# Patient Record
Sex: MALE | Race: BLACK OR AFRICAN AMERICAN | Employment: FULL TIME | URBAN - METROPOLITAN AREA
[De-identification: names, ages, dates, MRNs, and addresses within clinical notes are randomized per-mention and may not be internally consistent; named-entity substitution may affect disease eponyms.]

---

## 2018-01-15 ENCOUNTER — HOSPITAL ENCOUNTER (EMERGENCY)
Age: 53
Discharge: HOME | End: 2018-01-15
Attending: EMERGENCY MEDICINE
Payer: COMMERCIAL

## 2018-01-15 VITALS
HEART RATE: 92 BPM | OXYGEN SATURATION: 98 % | SYSTOLIC BLOOD PRESSURE: 173 MMHG | DIASTOLIC BLOOD PRESSURE: 95 MMHG | WEIGHT: 300 LBS | BODY MASS INDEX: 42.95 KG/M2 | RESPIRATION RATE: 16 BRPM | HEIGHT: 70 IN | TEMPERATURE: 98.6 F

## 2018-01-15 DIAGNOSIS — S46.911A RIGHT SHOULDER STRAIN, INITIAL ENCOUNTER: Primary | ICD-10-CM

## 2018-01-15 PROCEDURE — 99282 EMERGENCY DEPT VISIT SF MDM: CPT

## 2018-01-15 PROCEDURE — 74011250636 HC RX REV CODE- 250/636: Performed by: EMERGENCY MEDICINE

## 2018-01-15 PROCEDURE — 96372 THER/PROPH/DIAG INJ SC/IM: CPT

## 2018-01-15 RX ORDER — NAPROXEN 500 MG/1
500 TABLET ORAL 2 TIMES DAILY WITH MEALS
Qty: 20 TAB | Refills: 0 | Status: SHIPPED | OUTPATIENT
Start: 2018-01-15 | End: 2018-01-25

## 2018-01-15 RX ORDER — KETOROLAC TROMETHAMINE 30 MG/ML
60 INJECTION, SOLUTION INTRAMUSCULAR; INTRAVENOUS
Status: COMPLETED | OUTPATIENT
Start: 2018-01-15 | End: 2018-01-15

## 2018-01-15 RX ADMIN — KETOROLAC TROMETHAMINE 60 MG: 30 INJECTION, SOLUTION INTRAMUSCULAR at 15:56

## 2018-01-15 NOTE — ED TRIAGE NOTES
Reports pain in his right shoulder today starting 1 hour ago while working. Pt able to lift his right arm up.

## 2018-01-15 NOTE — ED PROVIDER NOTES
Patient is a 47 y.o. male presenting with shoulder pain. The history is provided by the patient. Shoulder Pain    The incident occurred 1 to 2 hours ago. The incident occurred at work. There was no injury mechanism (was pulling objects repeatedly with his arm). The right shoulder is affected. The pain is at a severity of 10/10. The pain has been constant since onset. The pain does not radiate. There is no history of shoulder injury. There is no history of shoulder surgery. No past medical history on file. Past Surgical History:   Procedure Laterality Date    HX HERNIA REPAIR      umbilical    HX ORTHOPAEDIC Left 2010    arthroscopy    HX OTHER SURGICAL Left     inguinal hernia repair         No family history on file. Social History     Social History    Marital status:      Spouse name: N/A    Number of children: N/A    Years of education: N/A     Occupational History    Not on file. Social History Main Topics    Smoking status: Current Every Day Smoker     Packs/day: 1.00     Years: 14.00    Smokeless tobacco: Never Used    Alcohol use 0.5 oz/week     1 Cans of beer per week      Comment: rare    Drug use: No    Sexual activity: Not on file     Other Topics Concern    Not on file     Social History Narrative    No narrative on file         ALLERGIES: Review of patient's allergies indicates no known allergies. Review of Systems   Constitutional: Negative for chills and fever. Respiratory: Negative for chest tightness and shortness of breath. Cardiovascular: Negative for chest pain. Musculoskeletal:        Right shoulder pain       Vitals:    01/15/18 1530   BP: (!) 173/95   Pulse: 92   Resp: 16   Temp: 98.6 °F (37 °C)   SpO2: 98%   Weight: 136.1 kg (300 lb)   Height: 5' 10\" (1.778 m)            Physical Exam   Constitutional: He is oriented to person, place, and time. He appears well-developed and well-nourished.    Cardiovascular: Normal rate, regular rhythm and intact distal pulses. Pulmonary/Chest: Effort normal. No respiratory distress. Musculoskeletal: Normal range of motion. He exhibits tenderness. He exhibits no deformity. Right shoulder: He exhibits tenderness and pain. He exhibits normal range of motion, no bony tenderness, no swelling, no effusion, no deformity, no laceration, no spasm, normal pulse and normal strength. Arms:  Neurological: He is alert and oriented to person, place, and time. Vitals reviewed. MDM  Number of Diagnoses or Management Options  Diagnosis management comments: Musculoskeletal shoulder pain - possible rotator cuff, more likely shoulder extensors being overused at work.   Treat with toradol and d/c home with RICE therapy and naprosyn and muscle relaxants  No EMC, no imaging needed    ED Course       Procedures

## 2018-07-03 ENCOUNTER — HOSPITAL ENCOUNTER (OUTPATIENT)
Dept: PREADMISSION TESTING | Age: 53
Discharge: HOME | End: 2018-07-03
Payer: OTHER MISCELLANEOUS

## 2018-07-03 VITALS
SYSTOLIC BLOOD PRESSURE: 145 MMHG | TEMPERATURE: 98.4 F | WEIGHT: 310.19 LBS | RESPIRATION RATE: 20 BRPM | BODY MASS INDEX: 43.43 KG/M2 | DIASTOLIC BLOOD PRESSURE: 69 MMHG | HEIGHT: 71 IN | HEART RATE: 72 BPM | OXYGEN SATURATION: 99 %

## 2018-07-03 LAB
ABO + RH BLD: NORMAL
ALBUMIN SERPL-MCNC: 3.8 G/DL (ref 3.5–5)
ALBUMIN/GLOB SERPL: 1.1 {RATIO} (ref 1.1–2.2)
ALP SERPL-CCNC: 59 U/L (ref 45–117)
ALT SERPL-CCNC: 33 U/L (ref 12–78)
ANION GAP SERPL CALC-SCNC: 8 MMOL/L (ref 5–15)
APPEARANCE UR: CLEAR
APTT PPP: 38.5 SEC (ref 22.1–32)
AST SERPL-CCNC: 18 U/L (ref 15–37)
ATRIAL RATE: 71 BPM
BACTERIA URNS QL MICRO: NEGATIVE /HPF
BASOPHILS # BLD: 0.1 K/UL (ref 0–0.1)
BASOPHILS NFR BLD: 1 % (ref 0–1)
BILIRUB SERPL-MCNC: 0.3 MG/DL (ref 0.2–1)
BILIRUB UR QL: NEGATIVE
BLOOD GROUP ANTIBODIES SERPL: NORMAL
BUN SERPL-MCNC: 11 MG/DL (ref 6–20)
BUN/CREAT SERPL: 10 (ref 12–20)
CALCIUM SERPL-MCNC: 9.3 MG/DL (ref 8.5–10.1)
CALCULATED P AXIS, ECG09: 40 DEGREES
CALCULATED R AXIS, ECG10: 8 DEGREES
CALCULATED T AXIS, ECG11: 45 DEGREES
CHLORIDE SERPL-SCNC: 104 MMOL/L (ref 97–108)
CO2 SERPL-SCNC: 28 MMOL/L (ref 21–32)
COLOR UR: ABNORMAL
CREAT SERPL-MCNC: 1.06 MG/DL (ref 0.7–1.3)
DIAGNOSIS, 93000: NORMAL
DIFFERENTIAL METHOD BLD: ABNORMAL
EOSINOPHIL # BLD: 0.4 K/UL (ref 0–0.4)
EOSINOPHIL NFR BLD: 6 % (ref 0–7)
EPITH CASTS URNS QL MICRO: ABNORMAL /LPF
ERYTHROCYTE [DISTWIDTH] IN BLOOD BY AUTOMATED COUNT: 12.6 % (ref 11.5–14.5)
EST. AVERAGE GLUCOSE BLD GHB EST-MCNC: 134 MG/DL
GLOBULIN SER CALC-MCNC: 3.5 G/DL (ref 2–4)
GLUCOSE SERPL-MCNC: 92 MG/DL (ref 65–100)
GLUCOSE UR STRIP.AUTO-MCNC: NEGATIVE MG/DL
HBA1C MFR BLD: 6.3 % (ref 4.2–6.3)
HCT VFR BLD AUTO: 43 % (ref 36.6–50.3)
HGB BLD-MCNC: 14.3 G/DL (ref 12.1–17)
HGB UR QL STRIP: NEGATIVE
HYALINE CASTS URNS QL MICRO: ABNORMAL /LPF (ref 0–5)
IMM GRANULOCYTES # BLD: 0 K/UL (ref 0–0.04)
IMM GRANULOCYTES NFR BLD AUTO: 1 % (ref 0–0.5)
INR PPP: 1 (ref 0.9–1.1)
KETONES UR QL STRIP.AUTO: NEGATIVE MG/DL
LEUKOCYTE ESTERASE UR QL STRIP.AUTO: ABNORMAL
LYMPHOCYTES # BLD: 3.1 K/UL (ref 0.8–3.5)
LYMPHOCYTES NFR BLD: 44 % (ref 12–49)
MCH RBC QN AUTO: 30.2 PG (ref 26–34)
MCHC RBC AUTO-ENTMCNC: 33.3 G/DL (ref 30–36.5)
MCV RBC AUTO: 90.9 FL (ref 80–99)
MONOCYTES # BLD: 0.5 K/UL (ref 0–1)
MONOCYTES NFR BLD: 8 % (ref 5–13)
NEUTS SEG # BLD: 2.8 K/UL (ref 1.8–8)
NEUTS SEG NFR BLD: 41 % (ref 32–75)
NITRITE UR QL STRIP.AUTO: NEGATIVE
NRBC # BLD: 0 K/UL (ref 0–0.01)
NRBC BLD-RTO: 0 PER 100 WBC
P-R INTERVAL, ECG05: 180 MS
PH UR STRIP: 5.5 [PH] (ref 5–8)
PLATELET # BLD AUTO: 290 K/UL (ref 150–400)
PMV BLD AUTO: 9.7 FL (ref 8.9–12.9)
POTASSIUM SERPL-SCNC: 5 MMOL/L (ref 3.5–5.1)
PROT SERPL-MCNC: 7.3 G/DL (ref 6.4–8.2)
PROT UR STRIP-MCNC: NEGATIVE MG/DL
PROTHROMBIN TIME: 10.4 SEC (ref 9–11.1)
Q-T INTERVAL, ECG07: 392 MS
QRS DURATION, ECG06: 78 MS
QTC CALCULATION (BEZET), ECG08: 425 MS
RBC # BLD AUTO: 4.73 M/UL (ref 4.1–5.7)
RBC #/AREA URNS HPF: ABNORMAL /HPF (ref 0–5)
SODIUM SERPL-SCNC: 140 MMOL/L (ref 136–145)
SP GR UR REFRACTOMETRY: 1.01 (ref 1–1.03)
SPECIMEN EXP DATE BLD: NORMAL
THERAPEUTIC RANGE,PTTT: ABNORMAL SECS (ref 58–77)
UA: UC IF INDICATED,UAUC: ABNORMAL
UROBILINOGEN UR QL STRIP.AUTO: 0.2 EU/DL (ref 0.2–1)
VENTRICULAR RATE, ECG03: 71 BPM
WBC # BLD AUTO: 6.9 K/UL (ref 4.1–11.1)
WBC URNS QL MICRO: ABNORMAL /HPF (ref 0–4)

## 2018-07-03 PROCEDURE — 80053 COMPREHEN METABOLIC PANEL: CPT | Performed by: ORTHOPAEDIC SURGERY

## 2018-07-03 PROCEDURE — 85610 PROTHROMBIN TIME: CPT | Performed by: ORTHOPAEDIC SURGERY

## 2018-07-03 PROCEDURE — 81001 URINALYSIS AUTO W/SCOPE: CPT | Performed by: ORTHOPAEDIC SURGERY

## 2018-07-03 PROCEDURE — 83036 HEMOGLOBIN GLYCOSYLATED A1C: CPT | Performed by: ORTHOPAEDIC SURGERY

## 2018-07-03 PROCEDURE — 36415 COLL VENOUS BLD VENIPUNCTURE: CPT | Performed by: ORTHOPAEDIC SURGERY

## 2018-07-03 PROCEDURE — 85025 COMPLETE CBC W/AUTO DIFF WBC: CPT | Performed by: ORTHOPAEDIC SURGERY

## 2018-07-03 PROCEDURE — 93005 ELECTROCARDIOGRAM TRACING: CPT

## 2018-07-03 PROCEDURE — 86900 BLOOD TYPING SEROLOGIC ABO: CPT | Performed by: ORTHOPAEDIC SURGERY

## 2018-07-03 PROCEDURE — 85730 THROMBOPLASTIN TIME PARTIAL: CPT | Performed by: ORTHOPAEDIC SURGERY

## 2018-07-03 PROCEDURE — 87086 URINE CULTURE/COLONY COUNT: CPT | Performed by: ORTHOPAEDIC SURGERY

## 2018-07-03 NOTE — H&P
PAT Pre-Op History & Physical    Patient: Laz Olivares. MRN: [id number]          SSN: [id number]  YOB: 1965          Age: 47 y.o. Sex: male                Subjective:     Patient is a 47 y.o.  male who presents with history of January 15 he felt a pop in his neck while at work. He felt extreme pain and was brought to the hospital. He had a full work up done. He was told he also has a torn RC. Pain is mostly in the back of his neck- base of neck and down into his right arm, numbness in first three fingers. He is right hand dominant. Has not tried to push his pain limits. Pain ranges from 2/10-4/10. Nothing specific raises the pain to higher. Sitting arm a certain way will flare up. Sleeping can be hard. Received shoulder injections and PT, ice/heat application, Tylenol, muslce relaxant, Tramadol, Gabapentin, prednisone. The patient was evaluated in the surgeon's office and it was determined that the most appropriate plan of care is to proceed with surgical intervention. Patient's PCP Scott Traore MD      Past Medical History:   Diagnosis Date    Arthritis     Chronic pain     lower back pain    [facility] 40.0-44.9[facility]    BMI- 43.3      Past Surgical History:   Procedure Laterality Date    HX HERNIA REPAIR      umbilical    HX HERNIA REPAIR      incisional    HX KNEE ARTHROSCOPY Right 1996    HX OPEN CHOLECYSTECTOMY  2014    HX OTHER SURGICAL Left     inguinal hernia repair    HX SHOULDER ARTHROSCOPY Left 2010      Prior to Admission medications    Medication Sig Start Date End Date Taking? Authorizing Provider   acetaminophen (TYLENOL EXTRA STRENGTH) 500 mg tablet Take 1,000 mg by mouth daily as needed for Pain. Yes Historical Provider     Current Outpatient Prescriptions   Medication Sig    acetaminophen (TYLENOL EXTRA STRENGTH) 500 mg tablet Take 1,000 mg by mouth daily as needed for Pain. No current facility-administered medications for this encounter. No Known Allergies   Social History   Substance Use Topics    Smoking status: Former Smoker     Packs/day: 1.00     Years: 15.00     Quit date: 6/17/2018    Smokeless tobacco: Never Used    Alcohol use 0.5 oz/week     1 Cans of beer per week      Comment: rare      History   Drug Use No     Family History   Problem Relation Age of Onset    Hypertension Mother     Diabetes Mother     Arrhythmia Mother      pacemaker    No Known Problems Father     Hypertension Sister     Diabetes Sister     Hypertension Brother     No Known Problems Brother     No Known Problems Brother          Review of Systems    Patient denies difficulty swallowing, mouth sores, or loose teeth. Patient denies any recent dental procedures or any planned prior to surgery. Patient denies chest pain, tightness, pain radiating down left arm, palpitations. Denies dizziness, visual disturbances, or lightheadedness. Patient denies shortness of breath, wheezing, cough, fever, or chills. Patient denies diarrhea, constipation, or abdominal pain. Patient denies urinary problems including dysuria, hesitancy, urgency, or incontinence. Denies skin breakdown, rashes, insect bites or open area. Objective:     Visit Vitals    /69 (BP 1 Location: Left arm, BP Patient Position: At rest;Sitting)    Pulse 72    Temp 98.4 °F (36.9 °C)    Resp 20    Ht 5' 11\" (1.803 m)    Wt 140.7 kg (310 lb 3 oz)    SpO2 99%    BMI 43.26 kg/m2       Body mass index is 43.26 kg/(m^2). Wt Readings from Last 1 Encounters:   07/03/18 140.7 kg (310 lb 3 oz)        Physical Exam:     General: Pleasant,  cooperative, no apparent distress, appears stated age. Eyes: Conjunctivae/corneas clear. EOMs intact. Nose: Nares normal.   Mouth/Throat: Lips, mucosa, and tongue normal. Teeth and gums normal.   Lungs: Clear to auscultation bilaterally.    Heart: Regular rate and rhythm, S1, S2 normal. No murmur, click, rub or gallop. Abdomen: Soft, non-tender. Bowel sounds normal. No distention. Musculoskeletal:  Painful cervical spine. Extremities:  Extremities normal, atraumatic, no cyanosis or edema. Calves                                 supple, non tender to palpation. Pulses: 2+ and symmetric bilateral upper extremities. Cap. refill <2 seconds   Skin: Skin color, texture, turgor normal. No visible open areas, examined fully clothed   Neurologic: CN II-XII grossly intact. Alert and oriented x3. Labs:   Recent Results (from the past 72 hour(s))   CBC WITH AUTOMATED DIFF    Collection Time: 07/03/18  8:57 AM   Result Value Ref Range    WBC 6.9 4.1 - 11.1 K/uL    RBC 4.73 4.10 - 5.70 M/uL    HGB 14.3 12.1 - 17.0 g/dL    HCT 43.0 36.6 - 50.3 %    MCV 90.9 80.0 - 99.0 FL    MCH 30.2 26.0 - 34.0 PG    MCHC 33.3 30.0 - 36.5 g/dL    RDW 12.6 11.5 - 14.5 %    PLATELET 381 993 - 774 K/uL    MPV 9.7 8.9 - 12.9 FL    NRBC 0.0 0  WBC    ABSOLUTE NRBC 0.00 0.00 - 0.01 K/uL    NEUTROPHILS 41 32 - 75 %    LYMPHOCYTES 44 12 - 49 %    MONOCYTES 8 5 - 13 %    EOSINOPHILS 6 0 - 7 %    BASOPHILS 1 0 - 1 %    IMMATURE GRANULOCYTES 1 (H) 0.0 - 0.5 %    ABS. NEUTROPHILS 2.8 1.8 - 8.0 K/UL    ABS. LYMPHOCYTES 3.1 0.8 - 3.5 K/UL    ABS. MONOCYTES 0.5 0.0 - 1.0 K/UL    ABS. EOSINOPHILS 0.4 0.0 - 0.4 K/UL    ABS. BASOPHILS 0.1 0.0 - 0.1 K/UL    ABS. IMM.  GRANS. 0.0 0.00 - 0.04 K/UL    DF AUTOMATED     METABOLIC PANEL, COMPREHENSIVE    Collection Time: 07/03/18  8:57 AM   Result Value Ref Range    Sodium 140 136 - 145 mmol/L    Potassium 5.0 3.5 - 5.1 mmol/L    Chloride 104 97 - 108 mmol/L    CO2 28 21 - 32 mmol/L    Anion gap 8 5 - 15 mmol/L    Glucose 92 65 - 100 mg/dL    BUN 11 6 - 20 MG/DL    Creatinine 1.06 0.70 - 1.30 MG/DL    BUN/Creatinine ratio 10 (L) 12 - 20      GFR est AA >60 >60 ml/min/1.73m2    GFR est non-AA >60 >60 ml/min/1.73m2    Calcium 9.3 8.5 - 10.1 MG/DL    Bilirubin, total 0.3 0.2 - 1.0 MG/DL    ALT (SGPT) 33 12 - 78 U/L    AST (SGOT) 18 15 - 37 U/L    Alk. phosphatase 59 45 - 117 U/L    Protein, total 7.3 6.4 - 8.2 g/dL    Albumin 3.8 3.5 - 5.0 g/dL    Globulin 3.5 2.0 - 4.0 g/dL    A-G Ratio 1.1 1.1 - 2.2     CULTURE, MRSA    Collection Time: 07/03/18  8:57 AM   Result Value Ref Range    Special Requests: NO SPECIAL REQUESTS      Culture result: MRSA NOT PRESENT      Culture result:            Screening of patient nares for MRSA is for surveillance purposes and, if positive, to facilitate isolation considerations in high risk settings. It is not intended for automatic decolonization interventions per se as regimens are not sufficiently effective to warrant routine use.    HEMOGLOBIN A1C WITH EAG    Collection Time: 07/03/18  9:25 AM   Result Value Ref Range    Hemoglobin A1c 6.3 4.2 - 6.3 %    Est. average glucose 134 mg/dL   PROTHROMBIN TIME + INR    Collection Time: 07/03/18  9:25 AM   Result Value Ref Range    INR 1.0 0.9 - 1.1      Prothrombin time 10.4 9.0 - 11.1 sec   PTT    Collection Time: 07/03/18  9:25 AM   Result Value Ref Range    aPTT 38.5 (H) 22.1 - 32.0 sec    aPTT, therapeutic range     58.0 - 77.0 SECS   URINALYSIS W/ REFLEX CULTURE    Collection Time: 07/03/18  9:25 AM   Result Value Ref Range    Color YELLOW/STRAW      Appearance CLEAR CLEAR      Specific gravity 1.013 1.003 - 1.030      pH (UA) 5.5 5.0 - 8.0      Protein NEGATIVE  NEG mg/dL    Glucose NEGATIVE  NEG mg/dL    Ketone NEGATIVE  NEG mg/dL    Bilirubin NEGATIVE  NEG      Blood NEGATIVE  NEG      Urobilinogen 0.2 0.2 - 1.0 EU/dL    Nitrites NEGATIVE  NEG      Leukocyte Esterase MODERATE (A) NEG      WBC 10-20 0 - 4 /hpf    RBC 0-5 0 - 5 /hpf    Epithelial cells FEW FEW /lpf    Bacteria NEGATIVE  NEG /hpf    UA:UC IF INDICATED URINE CULTURE ORDERED (A) CNI      Hyaline cast 0-2 0 - 5 /lpf   TYPE & SCREEN    Collection Time: 07/03/18  9:25 AM   Result Value Ref Range    Crossmatch Expiration 07/13/2018     ABO/Rh(D) B POSITIVE     Antibody screen NEG    CULTURE, URINE    Collection Time: 07/03/18  9:25 AM   Result Value Ref Range    Special Requests: NO SPECIAL REQUESTS  Reflexed from [id number]        Martinez Count <1,000 CFU/ML      Culture result: NO GROWTH 1 DAY     EKG, 12 LEAD, INITIAL    Collection Time: 07/03/18  9:43 AM   Result Value Ref Range    Ventricular Rate 71 BPM    Atrial Rate 71 BPM    P-R Interval 180 ms    QRS Duration 78 ms    Q-T Interval 392 ms    QTC Calculation (Bezet) 425 ms    Calculated P Axis 40 degrees    Calculated R Axis 8 degrees    Calculated T Axis 45 degrees    Diagnosis       Normal sinus rhythm  Normal ECG  When compared with ECG of 05-NOV-2013 21:24,  No significant change was found  Confirmed by Cindy Figueroa MD., Jessica ([id number]) on 7/3/2018 3:10:40 PM         Assessment:     CERVICAL RADICULITIS  CERVICAL DISC HERNIATION     Plan:     Scheduled for C6-C7  ANTERIOR CERVICAL DISCECTOMY FUSION WITH INSTRUMENTATION. All labs reviewed. EKG reviewed. MRSA negative. PTT returned high at 38.5- routed to surgeon via EMR.      Nisreen Duran, ADRIEN

## 2018-07-03 NOTE — PERIOP NOTES
[address] [employer] [facility], [address]    MAIN OR [phone]    MAIN PRE OP [phone]    AMBULATORY PRE OP [phone]    PRE-ADMISSION TESTING [phone]       Surgery Date:   Tuesday 7/10/18      Is surgery arrival time given by surgeon? NO  If NO, [facility] staff will call you between 3 and 7pm the day before your surgery with your arrival time. (If your surgery is on a Monday, we will call you the Friday before.)    Call [phone] after 7pm Monday-Friday if you did not receive your arrival time.     Answers to Common Questions   When You  Arrive   Arrive at the [facility] [address] on the day of your surgery  Have your insurance card, photo ID, and any copayment (if needed)     Food   and   Drink NO food or drink after midnight the night before surgery    This means NO water, gum, mints, coffee, juice, etc.  No alcohol (beer, wine, liquor) 24 hours before and after surgery     Medicine to   TAKE   Morning of Surgery MEDICATIONS TO TAKE THE MORNING OF SURGERY WITH A SIP OF WATER:    None      Medicine  To  STOP FOR PAIN   NO Aspirin for pain    NO Non-Steroidal Anti-Inflammatory Drugs (NSAIDs:   for example, Ibuprofen (Advil, Motrin), Naproxen (Aleve)   STOP herbal supplements and vitamins 1 week before surgery   You can take Tylenol  follow instructions on the bottle     Blood  Thinners    If you take Aspirin, Plavix, Coumadin, blood-thinning or anti-clot medicine, talk to your surgeon and/or follow the instructions from the doctor who told you to take that medicine     Clothing  Jewelry  Valuables  Bathing     CLOTHING   Wear loose, comfortable clothes   Wear glasses instead of contacts   Leave money, jewelry and valuables at home   No make-up, particularly mascara, the day of surgery   REMOVE ALL piercings, rings, and jewelry - leave at home   Wear hair loose or down; no pony-tails, buns, or metal hair clips    BATHING   Follow all special bath instructions (for total joint replacement, spine and bowel surgeries.)   If you shower the morning of surgery, please do not apply any lotions, powders, or deodorants afterwards. Do not shave or trim anywhere 24 hours before surgery. Going Home  or  Spending the Night    SAME-DAY SURGERY: You must have a responsible adult drive you home and stay with you 24 hours after surgery   ADMITS: If your doctor is keeping you into the hospital after surgery, leave personal belongings/luggage in your car until you have a hospital room number. Hospital discharge time is 12 noon  Drivers must be here before 12 noon unless you are told differently         Follow all instructions so your surgery wont be cancelled. Please, be on time. If a situation occurs and you are delayed the day of surgery, call [phone] or [phone]. If your physical condition changes (like a fever, cold, flu, etc.) call your surgeon as soon as possible. The Pread[facility] Testing staff can be reached at [phone]. OTHER SPECIAL INSTRUCTIONS:   · Use Chlorhexidine Care Fusion wash and sponges 3 days prior to surgery as instructed. · Incentive spirometer given with instructions to practice at home and bring back to the hospital on the day of surgery. · [facility] will contact you if your Hemoglobin A1C is greater than 7.5. · Ensure/Glucerna  sample, nutritional information, and Ensure/Glucerna coupon given. · Pain pamphlet and Call Don't Fall reminder reviewed with patient. ·  parking is complimentary Monday - Friday 7 am - 5 pm  · Bring PTA Medication list day of surgery with the last doses taken documented   Do not bring medication bottles the day of surgery    The patient was contacted  in person. He  verbalize  understanding of all instructions and does not  need reinforcement.

## 2018-07-04 LAB
BACTERIA SPEC CULT: NORMAL
CC UR VC: NORMAL
SERVICE CMNT-IMP: NORMAL
SERVICE CMNT-IMP: NORMAL

## 2018-08-01 ENCOUNTER — HOSPITAL ENCOUNTER (OUTPATIENT)
Dept: PREADMISSION TESTING | Age: 53
Discharge: HOME | End: 2018-08-01
Payer: OTHER MISCELLANEOUS

## 2018-08-01 VITALS
WEIGHT: 315 LBS | DIASTOLIC BLOOD PRESSURE: 59 MMHG | TEMPERATURE: 98.7 F | OXYGEN SATURATION: 100 % | HEART RATE: 71 BPM | BODY MASS INDEX: 45.1 KG/M2 | SYSTOLIC BLOOD PRESSURE: 132 MMHG | RESPIRATION RATE: 17 BRPM | HEIGHT: 70 IN

## 2018-08-01 LAB
ABO + RH BLD: NORMAL
ALBUMIN SERPL-MCNC: 3.9 G/DL (ref 3.5–5)
ALBUMIN/GLOB SERPL: 1 {RATIO} (ref 1.1–2.2)
ALP SERPL-CCNC: 63 U/L (ref 45–117)
ALT SERPL-CCNC: 38 U/L (ref 12–78)
ANION GAP SERPL CALC-SCNC: 7 MMOL/L (ref 5–15)
APPEARANCE UR: CLEAR
APTT PPP: 39.6 SEC (ref 22.1–32)
AST SERPL-CCNC: 23 U/L (ref 15–37)
BACTERIA URNS QL MICRO: NEGATIVE /HPF
BASOPHILS # BLD: 0 K/UL (ref 0–0.1)
BASOPHILS NFR BLD: 1 % (ref 0–1)
BILIRUB SERPL-MCNC: 0.3 MG/DL (ref 0.2–1)
BILIRUB UR QL: NEGATIVE
BLOOD GROUP ANTIBODIES SERPL: NORMAL
BUN SERPL-MCNC: 13 MG/DL (ref 6–20)
BUN/CREAT SERPL: 11 (ref 12–20)
CALCIUM SERPL-MCNC: 9 MG/DL (ref 8.5–10.1)
CHLORIDE SERPL-SCNC: 105 MMOL/L (ref 97–108)
CO2 SERPL-SCNC: 27 MMOL/L (ref 21–32)
COLOR UR: ABNORMAL
CREAT SERPL-MCNC: 1.16 MG/DL (ref 0.7–1.3)
DIFFERENTIAL METHOD BLD: ABNORMAL
EOSINOPHIL # BLD: 0.4 K/UL (ref 0–0.4)
EOSINOPHIL NFR BLD: 6 % (ref 0–7)
EPITH CASTS URNS QL MICRO: ABNORMAL /LPF
ERYTHROCYTE [DISTWIDTH] IN BLOOD BY AUTOMATED COUNT: 12.8 % (ref 11.5–14.5)
EST. AVERAGE GLUCOSE BLD GHB EST-MCNC: 137 MG/DL
GLOBULIN SER CALC-MCNC: 3.8 G/DL (ref 2–4)
GLUCOSE SERPL-MCNC: 85 MG/DL (ref 65–100)
GLUCOSE UR STRIP.AUTO-MCNC: NEGATIVE MG/DL
HBA1C MFR BLD: 6.4 % (ref 4.2–6.3)
HCT VFR BLD AUTO: 44.4 % (ref 36.6–50.3)
HGB BLD-MCNC: 14.8 G/DL (ref 12.1–17)
HGB UR QL STRIP: NEGATIVE
HYALINE CASTS URNS QL MICRO: ABNORMAL /LPF (ref 0–5)
IMM GRANULOCYTES # BLD: 0 K/UL (ref 0–0.04)
IMM GRANULOCYTES NFR BLD AUTO: 1 % (ref 0–0.5)
INR PPP: 1 (ref 0.9–1.1)
KETONES UR QL STRIP.AUTO: NEGATIVE MG/DL
LEUKOCYTE ESTERASE UR QL STRIP.AUTO: ABNORMAL
LYMPHOCYTES # BLD: 3.4 K/UL (ref 0.8–3.5)
LYMPHOCYTES NFR BLD: 48 % (ref 12–49)
MCH RBC QN AUTO: 30.1 PG (ref 26–34)
MCHC RBC AUTO-ENTMCNC: 33.3 G/DL (ref 30–36.5)
MCV RBC AUTO: 90.2 FL (ref 80–99)
MONOCYTES # BLD: 0.5 K/UL (ref 0–1)
MONOCYTES NFR BLD: 7 % (ref 5–13)
NEUTS SEG # BLD: 2.7 K/UL (ref 1.8–8)
NEUTS SEG NFR BLD: 38 % (ref 32–75)
NITRITE UR QL STRIP.AUTO: NEGATIVE
NRBC # BLD: 0 K/UL (ref 0–0.01)
NRBC BLD-RTO: 0 PER 100 WBC
PH UR STRIP: 6.5 [PH] (ref 5–8)
PLATELET # BLD AUTO: 287 K/UL (ref 150–400)
PMV BLD AUTO: 9.7 FL (ref 8.9–12.9)
POTASSIUM SERPL-SCNC: 4.4 MMOL/L (ref 3.5–5.1)
PROT SERPL-MCNC: 7.7 G/DL (ref 6.4–8.2)
PROT UR STRIP-MCNC: NEGATIVE MG/DL
PROTHROMBIN TIME: 10.2 SEC (ref 9–11.1)
RBC # BLD AUTO: 4.92 M/UL (ref 4.1–5.7)
RBC #/AREA URNS HPF: ABNORMAL /HPF (ref 0–5)
SODIUM SERPL-SCNC: 139 MMOL/L (ref 136–145)
SP GR UR REFRACTOMETRY: 1.02 (ref 1–1.03)
SPECIMEN EXP DATE BLD: NORMAL
THERAPEUTIC RANGE,PTTT: ABNORMAL SECS (ref 58–77)
UA: UC IF INDICATED,UAUC: ABNORMAL
UROBILINOGEN UR QL STRIP.AUTO: 0.2 EU/DL (ref 0.2–1)
WBC # BLD AUTO: 7 K/UL (ref 4.1–11.1)
WBC URNS QL MICRO: ABNORMAL /HPF (ref 0–4)

## 2018-08-01 PROCEDURE — 36415 COLL VENOUS BLD VENIPUNCTURE: CPT | Performed by: ORTHOPAEDIC SURGERY

## 2018-08-01 PROCEDURE — 85730 THROMBOPLASTIN TIME PARTIAL: CPT | Performed by: ORTHOPAEDIC SURGERY

## 2018-08-01 PROCEDURE — 80053 COMPREHEN METABOLIC PANEL: CPT | Performed by: ORTHOPAEDIC SURGERY

## 2018-08-01 PROCEDURE — 83036 HEMOGLOBIN GLYCOSYLATED A1C: CPT | Performed by: ORTHOPAEDIC SURGERY

## 2018-08-01 PROCEDURE — 81001 URINALYSIS AUTO W/SCOPE: CPT | Performed by: ORTHOPAEDIC SURGERY

## 2018-08-01 PROCEDURE — 86900 BLOOD TYPING SEROLOGIC ABO: CPT | Performed by: ORTHOPAEDIC SURGERY

## 2018-08-01 PROCEDURE — 85610 PROTHROMBIN TIME: CPT | Performed by: ORTHOPAEDIC SURGERY

## 2018-08-01 PROCEDURE — 85025 COMPLETE CBC W/AUTO DIFF WBC: CPT | Performed by: ORTHOPAEDIC SURGERY

## 2018-08-01 NOTE — H&P
PAT Pre-Op History & Physical 
 
Patient: Red Poplin. MRN: [id number]          SSN: [id number] YOB: 1965          Age: 47 y.o. Sex: male Subjective:  
Patient is a 47 y.o.  male who presents with history of chronic neck pain that began 01/15/2018 when he was injured at work. Patient states he was shoveling dirt when he felt a \"pop\" at the base of his neck and felt a pain in his right arm/hand. Also had rotator cuff tear- was treated with steroid injections and PT but patient states that the treatment for his rotator cuff escalated his neck pain. Rates his pain 3/10- 6/10 and states that it radiates from the base of his neck into his right shoulder and down his right arm to his hand, Describes the pain as constant aching but can also be shooting pain. Also c/o constant numbness and tingling  in his right index finger and the inner aspect of his right middle finger as well as the space between his index finger and thumb. Also c/o aching sharp pain in these areas of his right hand at times. Patient is right hand dominant. Cannot identify any particular movement or position that escalates his pain. States he can not lift much with his right hand. Has been out of work since he was injured. Has failed oral steroids, NSAIDs, Tylenol, Gabapentin, and Tramadol. The patient was evaluated in the surgeon's office and it was determined that the most appropriate plan of care is to proceed with surgical intervention. Patient's PCP Earnest Taylor MD 
 
 
 
 
 
Past Medical History:  
Diagnosis Date  Arthritis  Chronic pain   
 lower back pain  Morbid [facility] with BMI [facility] 45.0-49.9[facility]01[facility] BMI= 45.9 Past Surgical History:  
Procedure Laterality Date  HX HERNIA REPAIR  87/7998  
 umbilical  
 HX HERNIA REPAIR  2016  
 incisional  (above umbilicar repair)  HX KNEE ARTHROSCOPY Right 1996  HX OPEN CHOLECYSTECTOMY  2014  HX OTHER SURGICAL Left   
 inguinal hernia repair  HX SHOULDER ARTHROSCOPY Left 2010 Prior to Admission medications Medication Sig Start Date End Date Taking? Authorizing Provider  
acetaminophen (TYLENOL EXTRA STRENGTH) 500 mg tablet Take 1,000 mg by mouth daily as needed for Pain. Historical Provider Current Outpatient Prescriptions Medication Sig  
 acetaminophen (TYLENOL EXTRA STRENGTH) 500 mg tablet Take 1,000 mg by mouth daily as needed for Pain. No current facility-administered medications for this encounter. No Known Allergies Social History Substance Use Topics  Smoking status: Former Smoker Packs/day: 1.00 Years: 15.00 Quit date: 6/17/2018  Smokeless tobacco: Never Used  Alcohol use 0.5 oz/week 1 Cans of beer per week Comment: rare History Drug Use No  
 
Family History Problem Relation Age of Onset  Hypertension Mother  Diabetes Mother  Arrhythmia Mother   
  pacemaker  No Known Problems Father  Hypertension Sister  Diabetes Sister  Hypertension Brother  No Known Problems Brother  No Known Problems Brother Review of Systems Patient denies difficulty swallowing, mouth sores, or loose teeth. Patient denies any recent dental procedures or any planned prior to surgery. Patient denies chest pain, tightness, pain radiating down left arm, palpitations. Denies dizziness, visual disturbances, or lightheadedness. Patient denies shortness of breath, wheezing, cough, fever, or chills. Patient denies diarrhea, constipation, or abdominal pain. Patient denies urinary problems including dysuria, hesitancy, urgency, or incontinence. Denies skin breakdown, rashes, insect bites or open area. C/o pain in neck and right arm/hand. Objective:  
Patient Vitals for the past 24 hrs: 
 Temp Pulse Resp BP SpO2  
08/01/18 1127 98.7 °F (37.1 °C) 71 17 132/59 100 % Temp (24hrs), Av.7 °F (37.1 °C), Min:98.7 °F (37.1 °C), Max:98.7 °F (37.1 °C) Body mass index is 45.85 kg/(m^2). Wt Readings from Last 1 Encounters:  
18 142.9 kg (315 lb) Physical Exam: 
 
 General: Pleasant,  cooperative, no apparent distress, appears stated age. Obese. Eyes: Conjunctivae/corneas clear. EOMs intact. Nose: Nares normal. 
 Mouth/Throat: Lips, mucosa, and tongue normal. Teeth and gums normal. 
 Neck: Supple, symmetrical, trachea midline. Back: Symmetric Lungs: Clear to auscultation bilaterally. Heart: Regular rate and rhythm, S1, S2 normal. No murmur, click, rub or gallop. Abdomen: Soft, non-tender. Bowel sounds normal. No distention. Musculoskeletal:  Right  weaker than left. Extremities:  Extremities normal, atraumatic, no cyanosis or edema. Calves 
                               supple, non tender to palpation. Pulses: 2+ and symmetric bilateral upper extremities. Cap. refill <2 seconds Skin: Skin color, texture, turgor normal.  No rashes or lesions. Neurologic: CN II-XII grossly intact. Alert and oriented x3. Labs:  
Recent Results (from the past 72 hour(s)) CULTURE, MRSA Collection Time: 18 11:57 AM  
Result Value Ref Range Special Requests: NO SPECIAL REQUESTS Culture result: MRSA NOT PRESENT AT 14 HOURS    
CBC WITH AUTOMATED DIFF Collection Time: 18 12:05 PM  
Result Value Ref Range WBC 7.0 4.1 - 11.1 K/uL  
 RBC 4.92 4.10 - 5.70 M/uL  
 HGB 14.8 12.1 - 17.0 g/dL HCT 44.4 36.6 - 50.3 % MCV 90.2 80.0 - 99.0 FL  
 MCH 30.1 26.0 - 34.0 PG  
 MCHC 33.3 30.0 - 36.5 g/dL  
 RDW 12.8 11.5 - 14.5 % PLATELET 365 470 - 976 K/uL MPV 9.7 8.9 - 12.9 FL  
 NRBC 0.0 0  WBC ABSOLUTE NRBC 0.00 0.00 - 0.01 K/uL NEUTROPHILS 38 32 - 75 % LYMPHOCYTES 48 12 - 49 % MONOCYTES 7 5 - 13 % EOSINOPHILS 6 0 - 7 % BASOPHILS 1 0 - 1 % IMMATURE GRANULOCYTES 1 (H) 0.0 - 0.5 % ABS.  NEUTROPHILS 2.7 1.8 - 8.0 K/UL ABS. LYMPHOCYTES 3.4 0.8 - 3.5 K/UL  
 ABS. MONOCYTES 0.5 0.0 - 1.0 K/UL  
 ABS. EOSINOPHILS 0.4 0.0 - 0.4 K/UL  
 ABS. BASOPHILS 0.0 0.0 - 0.1 K/UL  
 ABS. IMM. GRANS. 0.0 0.00 - 0.04 K/UL  
 DF AUTOMATED METABOLIC PANEL, COMPREHENSIVE Collection Time: 08/01/18 12:05 PM  
Result Value Ref Range Sodium 139 136 - 145 mmol/L Potassium 4.4 3.5 - 5.1 mmol/L Chloride 105 97 - 108 mmol/L  
 CO2 27 21 - 32 mmol/L Anion gap 7 5 - 15 mmol/L Glucose 85 65 - 100 mg/dL BUN 13 6 - 20 MG/DL Creatinine 1.16 0.70 - 1.30 MG/DL  
 BUN/Creatinine ratio 11 (L) 12 - 20 GFR est AA >60 >60 ml/min/1.73m2 GFR est non-AA >60 >60 ml/min/1.73m2 Calcium 9.0 8.5 - 10.1 MG/DL Bilirubin, total 0.3 0.2 - 1.0 MG/DL  
 ALT (SGPT) 38 12 - 78 U/L  
 AST (SGOT) 23 15 - 37 U/L Alk. phosphatase 63 45 - 117 U/L Protein, total 7.7 6.4 - 8.2 g/dL Albumin 3.9 3.5 - 5.0 g/dL Globulin 3.8 2.0 - 4.0 g/dL A-G Ratio 1.0 (L) 1.1 - 2.2 HEMOGLOBIN A1C WITH EAG Collection Time: 08/01/18 12:05 PM  
Result Value Ref Range Hemoglobin A1c 6.4 (H) 4.2 - 6.3 % Est. average glucose 137 mg/dL PROTHROMBIN TIME + INR Collection Time: 08/01/18 12:05 PM  
Result Value Ref Range INR 1.0 0.9 - 1.1 Prothrombin time 10.2 9.0 - 11.1 sec PTT Collection Time: 08/01/18 12:05 PM  
Result Value Ref Range aPTT 39.6 (H) 22.1 - 32.0 sec  
 aPTT, therapeutic range     58.0 - 77.0 SECS  
TYPE & SCREEN Collection Time: 08/01/18 12:05 PM  
Result Value Ref Range Crossmatch Expiration 08/09/2018 ABO/Rh(D) B POSITIVE Antibody screen NEG   
URINALYSIS W/ REFLEX CULTURE Collection Time: 08/01/18 12:05 PM  
Result Value Ref Range Color YELLOW/STRAW Appearance CLEAR CLEAR Specific gravity 1.019 1.003 - 1.030    
 pH (UA) 6.5 5.0 - 8.0 Protein NEGATIVE  NEG mg/dL Glucose NEGATIVE  NEG mg/dL Ketone NEGATIVE  NEG mg/dL Bilirubin NEGATIVE  NEG  Blood NEGATIVE  NEG Urobilinogen 0.2 0.2 - 1.0 EU/dL Nitrites NEGATIVE  NEG Leukocyte Esterase TRACE (A) NEG    
 WBC 0-4 0 - 4 /hpf  
 RBC 0-5 0 - 5 /hpf Epithelial cells FEW FEW /lpf Bacteria NEGATIVE  NEG /hpf  
 UA:UC IF INDICATED CULTURE NOT INDICATED BY UA RESULT CNI Hyaline cast 0-2 0 - 5 /lpf Assessment:  
 
Cervical radiculitis Cervical disc herniation. Plan:  
 
Scheduled for C6-  C7 ACDF with instrumentation. Labs done per surgeon's orders. Lab results reviewed- unremarkable except PTT elevated (PT/INR WNL)- surgeon's office notified via EMR. MRSA pending. EKG done 7/03/2018 reviewed in [facility]- unremarkable.  
 
 
 
Radha Bishop NP

## 2018-08-01 NOTE — PERIOP NOTES
INSTRUCTIONS [address] [address] [address] MAIN OR [phone] MAIN PRE OP [phone] AMBULATORY PRE OP [phone] PRE-ADMISSION TESTING 21  Surgery Date:   8/6/18 Is surgery arrival time given by surgeon? NO If Jimmie Ni staff will call you between 3 and 7pm the day before your surgery with your arrival time. (If your surgery is on a Monday, we will call you the Friday before.) Call [phone] after 7pm Monday-Friday if you did not receive your arrival time. Answers to Common Questions When You 
Arrive Arrive at the 2nd [address] [facility] on the day of your surgery Have your insurance card, photo ID, and any copayment (if needed) Food 
 and  
Drink NO food or drink after midnight the night before surgery This means NO water, gum, mints, coffee, juice, etc. 
No alcohol (beer, wine, liquor) 24 hours before and after surgery Medicine to TAKE Morning of Surgery MEDICATIONS TO TAKE THE MORNING OF SURGERY WITH A SIP OF WATER:  
 NONE Medicine To 
STOP  
FOR PAIN 
 NO Aspirin for pain  NO Non-Steroidal Anti-Inflammatory Drugs (NSAIDs:  
for example, Ibuprofen (Advil, Motrin), Naproxen (Aleve)  STOP herbal supplements and vitamins 1 week before surgery  You can take Tylenol  follow instructions on the bottle Blood Thinners  If you take Aspirin, Plavix, Coumadin, blood-thinning or anti-clot medicine, talk to your surgeon and/or follow the instructions from the doctor who told you to take that medicine Clothing Jewelry Valuables Bathing CLOTHING 
 Wear loose, comfortable clothes  Wear glasses instead of contacts  Leave money, jewelry and valuables at home  No make-up, particularly mascara, the day of surgery  REMOVE ALL piercings, rings, and jewelry - leave at home  Wear hair loose or down; no pony-tails, buns, or metal hair clips BATHING 
 Follow all special bath instructions (for total joint replacement, spine and bowel surgeries.)  If you shower the morning of surgery, please do not apply any lotions, powders, or deodorants afterwards. Do not shave or trim anywhere 24 hours before surgery. Going Home 
or Spending the Night  SAME-DAY SURGERY: You must have a responsible adult drive you home and stay with you 24 hours after surgery  ADMITS: If your doctor is keeping you into the hospital after surgery, leave personal belongings/luggage in your car until you have a hospital room number. Hospital discharge time is 12 noon Drivers must be here before 12 noon unless you are told differently Follow all instructions so your surgery wont be cancelled. Please, be on time. If a situation occurs and you are delayed the day of surgery, call [phone] or [phone]. If your physical condition changes (like a fever, cold, flu, etc.) call your surgeon as soon as possible. The Preadmission [facility] staff can be reached at [phone]. OTHER SPECIAL INSTRUCTIONS:    Special Instructions: · Use Chlorhexidine Care Fusion wash and sponges 3 days prior to surgery as instructed. · Incentive spirometer given with instructions to practice at home and bring back to the hospital on the day of surgery. · [facility] will contact you if your Hemoglobin A1C is greater than 7.5. · Ensure/Glucerna  sample, nutritional information, and Ensure/Glucerna coupon given. · Pain pamphlet and Call Don't Fall reminder reviewed with patient. ·  parking is complimentary Monday - Friday 7 am - 5 pm 
· Bring PTA Medication list day of surgery with the last doses taken documented The patient was contacted  in person. He  verbalize  understanding of all instructions and does not  need reinforcement.

## 2018-08-02 LAB
BACTERIA SPEC CULT: NORMAL
BACTERIA SPEC CULT: NORMAL
SERVICE CMNT-IMP: NORMAL

## 2018-08-03 ENCOUNTER — ANESTHESIA EVENT (OUTPATIENT)
Dept: SURGERY | Age: 53
End: 2018-08-03
Payer: OTHER MISCELLANEOUS

## 2018-08-06 ENCOUNTER — HOSPITAL ENCOUNTER (OUTPATIENT)
Age: 53
Setting detail: OBSERVATION
Discharge: HOME | End: 2018-08-07
Attending: ORTHOPAEDIC SURGERY | Admitting: ORTHOPAEDIC SURGERY
Payer: OTHER MISCELLANEOUS

## 2018-08-06 ENCOUNTER — ANESTHESIA (OUTPATIENT)
Dept: SURGERY | Age: 53
End: 2018-08-06
Payer: OTHER MISCELLANEOUS

## 2018-08-06 ENCOUNTER — APPOINTMENT (OUTPATIENT)
Dept: GENERAL RADIOLOGY | Age: 53
End: 2018-08-06
Attending: ORTHOPAEDIC SURGERY
Payer: OTHER MISCELLANEOUS

## 2018-08-06 DIAGNOSIS — M48.02 CERVICAL STENOSIS OF SPINAL CANAL: Primary | ICD-10-CM

## 2018-08-06 PROCEDURE — 94762 N-INVAS EAR/PLS OXIMTRY CONT: CPT

## 2018-08-06 PROCEDURE — 76060000034 HC ANESTHESIA 1.5 TO 2 HR: Performed by: ORTHOPAEDIC SURGERY

## 2018-08-06 PROCEDURE — C1713 ANCHOR/SCREW BN/BN,TIS/BN: HCPCS | Performed by: ORTHOPAEDIC SURGERY

## 2018-08-06 PROCEDURE — 77030019908 HC STETH ESOPH SIMS -A: Performed by: NURSE ANESTHETIST, CERTIFIED REGISTERED

## 2018-08-06 PROCEDURE — 76010000162 HC OR TIME 1.5 TO 2 HR INTENSV-TIER 1: Performed by: ORTHOPAEDIC SURGERY

## 2018-08-06 PROCEDURE — 77030004391 HC BUR FLUT MEDT -C: Performed by: ORTHOPAEDIC SURGERY

## 2018-08-06 PROCEDURE — 74011250636 HC RX REV CODE- 250/636: Performed by: ORTHOPAEDIC SURGERY

## 2018-08-06 PROCEDURE — 77030012406 HC DRN WND PENRS BARD -A: Performed by: ORTHOPAEDIC SURGERY

## 2018-08-06 PROCEDURE — 77030018846 HC SOL IRR STRL H20 ICUM -A: Performed by: ORTHOPAEDIC SURGERY

## 2018-08-06 PROCEDURE — 77030031139 HC SUT VCRL2 J&J -A: Performed by: ORTHOPAEDIC SURGERY

## 2018-08-06 PROCEDURE — 77030037302 HC SPCR CERV LORDTC INLC -G: Performed by: ORTHOPAEDIC SURGERY

## 2018-08-06 PROCEDURE — 77030003666 HC NDL SPINAL BD -A: Performed by: ORTHOPAEDIC SURGERY

## 2018-08-06 PROCEDURE — 77030008684 HC TU ET CUF COVD -B: Performed by: NURSE ANESTHETIST, CERTIFIED REGISTERED

## 2018-08-06 PROCEDURE — 99218 HC RM OBSERVATION: CPT

## 2018-08-06 PROCEDURE — 74011250636 HC RX REV CODE- 250/636

## 2018-08-06 PROCEDURE — 76001 XR FLUOROSCOPY OVER 60 MINUTES: CPT

## 2018-08-06 PROCEDURE — 74011000250 HC RX REV CODE- 250

## 2018-08-06 PROCEDURE — 74011250636 HC RX REV CODE- 250/636: Performed by: ANESTHESIOLOGY

## 2018-08-06 PROCEDURE — 76210000016 HC OR PH I REC 1 TO 1.5 HR: Performed by: ORTHOPAEDIC SURGERY

## 2018-08-06 PROCEDURE — 77030011640 HC PAD GRND REM COVD -A: Performed by: ORTHOPAEDIC SURGERY

## 2018-08-06 PROCEDURE — 77030030102 HC BIT DRL PYRNES K2M -B: Performed by: ORTHOPAEDIC SURGERY

## 2018-08-06 PROCEDURE — 77030026438 HC STYL ET INTUB CARD -A: Performed by: NURSE ANESTHETIST, CERTIFIED REGISTERED

## 2018-08-06 PROCEDURE — 77030029099 HC BN WAX SSPC -A: Performed by: ORTHOPAEDIC SURGERY

## 2018-08-06 PROCEDURE — 77030018836 HC SOL IRR NACL ICUM -A: Performed by: ORTHOPAEDIC SURGERY

## 2018-08-06 PROCEDURE — 74011000250 HC RX REV CODE- 250: Performed by: ORTHOPAEDIC SURGERY

## 2018-08-06 PROCEDURE — 74011250637 HC RX REV CODE- 250/637: Performed by: ORTHOPAEDIC SURGERY

## 2018-08-06 PROCEDURE — 77030018673: Performed by: ORTHOPAEDIC SURGERY

## 2018-08-06 PROCEDURE — 77030002933 HC SUT MCRYL J&J -A: Performed by: ORTHOPAEDIC SURGERY

## 2018-08-06 PROCEDURE — 77030032490 HC SLV COMPR SCD KNE COVD -B: Performed by: ORTHOPAEDIC SURGERY

## 2018-08-06 PROCEDURE — 77030020782 HC GWN BAIR PAWS FLX 3M -B

## 2018-08-06 PROCEDURE — 74011000272 HC RX REV CODE- 272: Performed by: ORTHOPAEDIC SURGERY

## 2018-08-06 PROCEDURE — 77030011267 HC ELECTRD BLD COVD -A: Performed by: ORTHOPAEDIC SURGERY

## 2018-08-06 DEVICE — PLATE SPNL L22MM BILAT ANT CERV TI 1 LEV CONSTRN LO PROF: Type: IMPLANTABLE DEVICE | Site: NECK | Status: FUNCTIONAL

## 2018-08-06 DEVICE — IMPLANTABLE DEVICE: Type: IMPLANTABLE DEVICE | Site: SPINE CERVICAL | Status: FUNCTIONAL

## 2018-08-06 RX ORDER — SODIUM CHLORIDE, SODIUM LACTATE, POTASSIUM CHLORIDE, CALCIUM CHLORIDE 600; 310; 30; 20 MG/100ML; MG/100ML; MG/100ML; MG/100ML
125 INJECTION, SOLUTION INTRAVENOUS CONTINUOUS
Status: DISCONTINUED | OUTPATIENT
Start: 2018-08-06 | End: 2018-08-06 | Stop reason: HOSPADM

## 2018-08-06 RX ORDER — HYDROMORPHONE HCL IN 0.9% NACL 15 MG/30ML
PATIENT CONTROLLED ANALGESIA VIAL INTRAVENOUS
Status: DISPENSED | OUTPATIENT
Start: 2018-08-06 | End: 2018-08-07

## 2018-08-06 RX ORDER — HYDROMORPHONE HYDROCHLORIDE 2 MG/ML
0.5 INJECTION, SOLUTION INTRAMUSCULAR; INTRAVENOUS; SUBCUTANEOUS
Status: ACTIVE | OUTPATIENT
Start: 2018-08-06 | End: 2018-08-07

## 2018-08-06 RX ORDER — ONDANSETRON 2 MG/ML
4 INJECTION INTRAMUSCULAR; INTRAVENOUS
Status: DISCONTINUED | OUTPATIENT
Start: 2018-08-06 | End: 2018-08-07 | Stop reason: HOSPADM

## 2018-08-06 RX ORDER — CEFAZOLIN SODIUM/WATER 2 G/20 ML
2 SYRINGE (ML) INTRAVENOUS
Status: DISCONTINUED | OUTPATIENT
Start: 2018-08-06 | End: 2018-08-06

## 2018-08-06 RX ORDER — SODIUM CHLORIDE 0.9 % (FLUSH) 0.9 %
5-10 SYRINGE (ML) INJECTION AS NEEDED
Status: DISCONTINUED | OUTPATIENT
Start: 2018-08-06 | End: 2018-08-07 | Stop reason: HOSPADM

## 2018-08-06 RX ORDER — CYCLOBENZAPRINE HCL 10 MG
10 TABLET ORAL
Status: DISCONTINUED | OUTPATIENT
Start: 2018-08-06 | End: 2018-08-07 | Stop reason: HOSPADM

## 2018-08-06 RX ORDER — SODIUM CHLORIDE, SODIUM LACTATE, POTASSIUM CHLORIDE, CALCIUM CHLORIDE 600; 310; 30; 20 MG/100ML; MG/100ML; MG/100ML; MG/100ML
INJECTION, SOLUTION INTRAVENOUS
Status: DISCONTINUED | OUTPATIENT
Start: 2018-08-06 | End: 2018-08-06 | Stop reason: HOSPADM

## 2018-08-06 RX ORDER — ACETAMINOPHEN 325 MG/1
650 TABLET ORAL
Status: DISCONTINUED | OUTPATIENT
Start: 2018-08-06 | End: 2018-08-07 | Stop reason: HOSPADM

## 2018-08-06 RX ORDER — SODIUM CHLORIDE 0.9 % (FLUSH) 0.9 %
5-10 SYRINGE (ML) INJECTION AS NEEDED
Status: DISCONTINUED | OUTPATIENT
Start: 2018-08-06 | End: 2018-08-06 | Stop reason: HOSPADM

## 2018-08-06 RX ORDER — SODIUM CHLORIDE 0.9 % (FLUSH) 0.9 %
5-10 SYRINGE (ML) INJECTION EVERY 8 HOURS
Status: DISCONTINUED | OUTPATIENT
Start: 2018-08-06 | End: 2018-08-06 | Stop reason: HOSPADM

## 2018-08-06 RX ORDER — DEXAMETHASONE SODIUM PHOSPHATE 4 MG/ML
INJECTION, SOLUTION INTRA-ARTICULAR; INTRALESIONAL; INTRAMUSCULAR; INTRAVENOUS; SOFT TISSUE AS NEEDED
Status: DISCONTINUED | OUTPATIENT
Start: 2018-08-06 | End: 2018-08-06 | Stop reason: HOSPADM

## 2018-08-06 RX ORDER — ROCURONIUM BROMIDE 10 MG/ML
INJECTION, SOLUTION INTRAVENOUS AS NEEDED
Status: DISCONTINUED | OUTPATIENT
Start: 2018-08-06 | End: 2018-08-06 | Stop reason: HOSPADM

## 2018-08-06 RX ORDER — LIDOCAINE HYDROCHLORIDE 20 MG/ML
INJECTION, SOLUTION EPIDURAL; INFILTRATION; INTRACAUDAL; PERINEURAL AS NEEDED
Status: DISCONTINUED | OUTPATIENT
Start: 2018-08-06 | End: 2018-08-06 | Stop reason: HOSPADM

## 2018-08-06 RX ORDER — SODIUM CHLORIDE 9 MG/ML
125 INJECTION, SOLUTION INTRAVENOUS CONTINUOUS
Status: DISPENSED | OUTPATIENT
Start: 2018-08-06 | End: 2018-08-07

## 2018-08-06 RX ORDER — DIPHENHYDRAMINE HYDROCHLORIDE 50 MG/ML
12.5 INJECTION, SOLUTION INTRAMUSCULAR; INTRAVENOUS AS NEEDED
Status: DISCONTINUED | OUTPATIENT
Start: 2018-08-06 | End: 2018-08-06 | Stop reason: HOSPADM

## 2018-08-06 RX ORDER — SODIUM CHLORIDE 0.9 % (FLUSH) 0.9 %
5-10 SYRINGE (ML) INJECTION EVERY 8 HOURS
Status: DISCONTINUED | OUTPATIENT
Start: 2018-08-07 | End: 2018-08-07 | Stop reason: HOSPADM

## 2018-08-06 RX ORDER — FAMOTIDINE 20 MG/1
20 TABLET, FILM COATED ORAL 2 TIMES DAILY
Status: DISCONTINUED | OUTPATIENT
Start: 2018-08-06 | End: 2018-08-07 | Stop reason: HOSPADM

## 2018-08-06 RX ORDER — HYDROMORPHONE HYDROCHLORIDE 2 MG/ML
INJECTION, SOLUTION INTRAMUSCULAR; INTRAVENOUS; SUBCUTANEOUS AS NEEDED
Status: DISCONTINUED | OUTPATIENT
Start: 2018-08-06 | End: 2018-08-06 | Stop reason: HOSPADM

## 2018-08-06 RX ORDER — NALOXONE HYDROCHLORIDE 0.4 MG/ML
0.4 INJECTION, SOLUTION INTRAMUSCULAR; INTRAVENOUS; SUBCUTANEOUS AS NEEDED
Status: DISCONTINUED | OUTPATIENT
Start: 2018-08-06 | End: 2018-08-07 | Stop reason: HOSPADM

## 2018-08-06 RX ORDER — CEFAZOLIN SODIUM/WATER 2 G/20 ML
2 SYRINGE (ML) INTRAVENOUS EVERY 8 HOURS
Status: COMPLETED | OUTPATIENT
Start: 2018-08-06 | End: 2018-08-07

## 2018-08-06 RX ORDER — SUCCINYLCHOLINE CHLORIDE 20 MG/ML
INJECTION INTRAMUSCULAR; INTRAVENOUS AS NEEDED
Status: DISCONTINUED | OUTPATIENT
Start: 2018-08-06 | End: 2018-08-06 | Stop reason: HOSPADM

## 2018-08-06 RX ORDER — FENTANYL CITRATE 50 UG/ML
INJECTION, SOLUTION INTRAMUSCULAR; INTRAVENOUS AS NEEDED
Status: DISCONTINUED | OUTPATIENT
Start: 2018-08-06 | End: 2018-08-06 | Stop reason: HOSPADM

## 2018-08-06 RX ORDER — NALOXONE HYDROCHLORIDE 0.4 MG/ML
0.2 INJECTION, SOLUTION INTRAMUSCULAR; INTRAVENOUS; SUBCUTANEOUS
Status: DISCONTINUED | OUTPATIENT
Start: 2018-08-06 | End: 2018-08-06 | Stop reason: HOSPADM

## 2018-08-06 RX ORDER — FACIAL-BODY WIPES
10 EACH TOPICAL DAILY PRN
Status: DISCONTINUED | OUTPATIENT
Start: 2018-08-08 | End: 2018-08-07 | Stop reason: HOSPADM

## 2018-08-06 RX ORDER — ONDANSETRON 2 MG/ML
INJECTION INTRAMUSCULAR; INTRAVENOUS AS NEEDED
Status: DISCONTINUED | OUTPATIENT
Start: 2018-08-06 | End: 2018-08-06 | Stop reason: HOSPADM

## 2018-08-06 RX ORDER — GLYCOPYRROLATE 0.2 MG/ML
INJECTION INTRAMUSCULAR; INTRAVENOUS AS NEEDED
Status: DISCONTINUED | OUTPATIENT
Start: 2018-08-06 | End: 2018-08-06 | Stop reason: HOSPADM

## 2018-08-06 RX ORDER — AMOXICILLIN 250 MG
1 CAPSULE ORAL 2 TIMES DAILY
Status: DISCONTINUED | OUTPATIENT
Start: 2018-08-07 | End: 2018-08-07 | Stop reason: HOSPADM

## 2018-08-06 RX ORDER — POLYETHYLENE GLYCOL 3350 17 G/17G
17 POWDER, FOR SOLUTION ORAL DAILY
Status: DISCONTINUED | OUTPATIENT
Start: 2018-08-07 | End: 2018-08-07 | Stop reason: HOSPADM

## 2018-08-06 RX ORDER — OXYCODONE HYDROCHLORIDE 5 MG/1
5 TABLET ORAL
Status: DISCONTINUED | OUTPATIENT
Start: 2018-08-06 | End: 2018-08-07 | Stop reason: HOSPADM

## 2018-08-06 RX ORDER — PROPOFOL 10 MG/ML
INJECTION, EMULSION INTRAVENOUS
Status: DISCONTINUED | OUTPATIENT
Start: 2018-08-06 | End: 2018-08-06 | Stop reason: HOSPADM

## 2018-08-06 RX ORDER — DIPHENHYDRAMINE HYDROCHLORIDE 50 MG/ML
12.5 INJECTION, SOLUTION INTRAMUSCULAR; INTRAVENOUS
Status: DISCONTINUED | OUTPATIENT
Start: 2018-08-06 | End: 2018-08-07 | Stop reason: HOSPADM

## 2018-08-06 RX ORDER — MIDAZOLAM HYDROCHLORIDE 1 MG/ML
INJECTION, SOLUTION INTRAMUSCULAR; INTRAVENOUS AS NEEDED
Status: DISCONTINUED | OUTPATIENT
Start: 2018-08-06 | End: 2018-08-06 | Stop reason: HOSPADM

## 2018-08-06 RX ORDER — HYDROMORPHONE HYDROCHLORIDE 1 MG/ML
.25-1 INJECTION, SOLUTION INTRAMUSCULAR; INTRAVENOUS; SUBCUTANEOUS
Status: DISCONTINUED | OUTPATIENT
Start: 2018-08-06 | End: 2018-08-06 | Stop reason: HOSPADM

## 2018-08-06 RX ORDER — PROPOFOL 10 MG/ML
INJECTION, EMULSION INTRAVENOUS AS NEEDED
Status: DISCONTINUED | OUTPATIENT
Start: 2018-08-06 | End: 2018-08-06 | Stop reason: HOSPADM

## 2018-08-06 RX ORDER — FLUMAZENIL 0.1 MG/ML
0.2 INJECTION INTRAVENOUS
Status: DISCONTINUED | OUTPATIENT
Start: 2018-08-06 | End: 2018-08-06 | Stop reason: HOSPADM

## 2018-08-06 RX ORDER — OXYCODONE HYDROCHLORIDE 5 MG/1
10 TABLET ORAL
Status: DISCONTINUED | OUTPATIENT
Start: 2018-08-06 | End: 2018-08-07 | Stop reason: HOSPADM

## 2018-08-06 RX ORDER — LIDOCAINE HYDROCHLORIDE 10 MG/ML
0.1 INJECTION, SOLUTION EPIDURAL; INFILTRATION; INTRACAUDAL; PERINEURAL AS NEEDED
Status: DISCONTINUED | OUTPATIENT
Start: 2018-08-06 | End: 2018-08-06 | Stop reason: HOSPADM

## 2018-08-06 RX ADMIN — HYDROMORPHONE HYDROCHLORIDE 0.5 MG: 2 INJECTION, SOLUTION INTRAMUSCULAR; INTRAVENOUS; SUBCUTANEOUS at 09:10

## 2018-08-06 RX ADMIN — PROPOFOL 300 MG: 10 INJECTION, EMULSION INTRAVENOUS at 07:38

## 2018-08-06 RX ADMIN — CEFAZOLIN 3 G: 1 INJECTION, POWDER, FOR SOLUTION INTRAMUSCULAR; INTRAVENOUS; PARENTERAL at 07:44

## 2018-08-06 RX ADMIN — SODIUM CHLORIDE 125 ML/HR: 900 INJECTION, SOLUTION INTRAVENOUS at 18:05

## 2018-08-06 RX ADMIN — GLYCOPYRROLATE 0.1 MG: 0.2 INJECTION INTRAMUSCULAR; INTRAVENOUS at 07:37

## 2018-08-06 RX ADMIN — Medication 2 G: at 13:24

## 2018-08-06 RX ADMIN — CYCLOBENZAPRINE HYDROCHLORIDE 10 MG: 10 TABLET, FILM COATED ORAL at 13:29

## 2018-08-06 RX ADMIN — SODIUM CHLORIDE, SODIUM LACTATE, POTASSIUM CHLORIDE, CALCIUM CHLORIDE: 600; 310; 30; 20 INJECTION, SOLUTION INTRAVENOUS at 07:45

## 2018-08-06 RX ADMIN — FAMOTIDINE 20 MG: 20 TABLET ORAL at 18:24

## 2018-08-06 RX ADMIN — PROPOFOL 80 MCG/KG/MIN: 10 INJECTION, EMULSION INTRAVENOUS at 07:38

## 2018-08-06 RX ADMIN — SODIUM CHLORIDE 125 ML/HR: 900 INJECTION, SOLUTION INTRAVENOUS at 10:07

## 2018-08-06 RX ADMIN — DEXAMETHASONE SODIUM PHOSPHATE 10 MG: 4 INJECTION, SOLUTION INTRA-ARTICULAR; INTRALESIONAL; INTRAMUSCULAR; INTRAVENOUS; SOFT TISSUE at 08:15

## 2018-08-06 RX ADMIN — MIDAZOLAM HYDROCHLORIDE 1 MG: 1 INJECTION, SOLUTION INTRAMUSCULAR; INTRAVENOUS at 07:38

## 2018-08-06 RX ADMIN — Medication 2 G: at 21:48

## 2018-08-06 RX ADMIN — FENTANYL CITRATE 100 MCG: 50 INJECTION, SOLUTION INTRAMUSCULAR; INTRAVENOUS at 08:15

## 2018-08-06 RX ADMIN — MIDAZOLAM HYDROCHLORIDE 4 MG: 1 INJECTION, SOLUTION INTRAMUSCULAR; INTRAVENOUS at 07:33

## 2018-08-06 RX ADMIN — HYDROMORPHONE HYDROCHLORIDE 0.5 MG: 2 INJECTION, SOLUTION INTRAMUSCULAR; INTRAVENOUS; SUBCUTANEOUS at 09:15

## 2018-08-06 RX ADMIN — SUCCINYLCHOLINE CHLORIDE 160 MG: 20 INJECTION INTRAMUSCULAR; INTRAVENOUS at 07:38

## 2018-08-06 RX ADMIN — LIDOCAINE HYDROCHLORIDE 60 MG: 20 INJECTION, SOLUTION EPIDURAL; INFILTRATION; INTRACAUDAL; PERINEURAL at 07:38

## 2018-08-06 RX ADMIN — FENTANYL CITRATE 250 MCG: 50 INJECTION, SOLUTION INTRAMUSCULAR; INTRAVENOUS at 07:38

## 2018-08-06 RX ADMIN — ROCURONIUM BROMIDE 20 MG: 10 INJECTION, SOLUTION INTRAVENOUS at 07:43

## 2018-08-06 RX ADMIN — ONDANSETRON 4 MG: 2 INJECTION INTRAMUSCULAR; INTRAVENOUS at 08:15

## 2018-08-06 RX ADMIN — BENZOCAINE AND MENTHOL 1 LOZENGE: 15; 3.6 LOZENGE ORAL at 13:29

## 2018-08-06 RX ADMIN — SODIUM CHLORIDE, SODIUM LACTATE, POTASSIUM CHLORIDE, AND CALCIUM CHLORIDE 125 ML/HR: 600; 310; 30; 20 INJECTION, SOLUTION INTRAVENOUS at 06:38

## 2018-08-06 RX ADMIN — Medication: at 10:06

## 2018-08-06 RX ADMIN — ROCURONIUM BROMIDE 10 MG: 10 INJECTION, SOLUTION INTRAVENOUS at 07:38

## 2018-08-06 RX ADMIN — FAMOTIDINE 20 MG: 20 TABLET ORAL at 12:25

## 2018-08-06 NOTE — H&P
Date of Surgery Update:  Laz Olivares. was seen and examined. History and physical has been reviewed. The patient has been examined.  There have been no significant clinical changes since the completion of the originally dated History and Physical.    Signed By: Armond Winn MD     August 6, 2018 7:30 AM

## 2018-08-06 NOTE — IP AVS SNAPSHOT
Meghana Mcclure 
 
 
 [address] [facility] [address] [facility] 
[phone] Patient: Kathy Reese. MRN: [id number] :1965 About your hospitalization You were admitted on:  2018 You last received care in the:  [facility] 4M POST SURG ORT 1 You were discharged on:  2018 Why you were hospitalized Your primary diagnosis was:  Not on File Your diagnoses also included:  Cervical Stenosis Of Spinal Canal  
  
Follow-up Information Follow up With Details Comments Contact Info Melina Rossi MD   [address] [facility] [address] [facility] 
[phone] Discharge Orders None A check josefa indicates which time of day the medication should be taken. My Medications START taking these medications Instructions Each Dose to Equal  
 Morning Noon Evening Bedtime  
 cyclobenzaprine 10 mg tablet Commonly known as:  FLEXERIL Your last dose was:  18  At 1:29 pm  
   
 Take 1 Tab by mouth three (3) times daily as needed for Muscle Spasm(s). 10 mg  
    
   
   
   
  
 docusate sodium 100 mg capsule Commonly known as:  Vicente Stevens Notes to Patient:  You did not receive this medication while here in the hospital  
   
 Take 1 Cap by mouth two (2) times a day. 100 mg  
    
   
   
   
  
 oxyCODONE-acetaminophen 5-325 mg per tablet Commonly known as:  PERCOCET Notes to Patient:  You did not receive this medication here in the hospital  
   
 Take 1-2 Tabs by mouth every six (6) hours as needed for Pain. Max Daily Amount: 8 Tabs. 1-2 Tab  
    
   
   
   
  
 promethazine 25 mg tablet Commonly known as:  PHENERGAN Notes to Patient:  You did not receive this medication while here in the hospital  
   
 Take 1 Tab by mouth every six (6) hours as needed for Nausea. 25 mg  
    
   
   
   
  
  
STOP taking these medications TYLENOL EXTRA STRENGTH 500 mg tablet Generic drug:  acetaminophen Where to Get Your Medications Information on where to get these meds will be given to you by the nurse or doctor. ! Ask your nurse or doctor about these medications  
  cyclobenzaprine 10 mg tablet  
 docusate sodium 100 mg capsule  
 oxyCODONE-acetaminophen 5-325 mg per tablet  
 promethazine 25 mg tablet Opioid Education Prescription Opioids: What You Need to Know: 
 
Prescription opioids can be used to help relieve moderate-to-severe pain and are often prescribed following a surgery or injury, or for certain health conditions. These medications can be an important part of treatment but also come with serious risks. Opioids are strong pain medicines. Examples include hydrocodone, oxycodone, fentanyl, and morphine. Heroin is an example of an illegal opioid. It is important to work with your health care provider to make sure you are getting the safest, most effective care. WHAT ARE THE RISKS AND SIDE EFFECTS OF OPIOID USE? Prescription opioids carry serious risks of addiction and overdose, especially with prolonged use. An opioid overdose, often marked by slow breathing, can cause sudden death. The use of prescription opioids can have a number of side effects as well, even when taken as directed. · Tolerance-meaning you might need to take more of a medication for the same pain relief · Physical dependence-meaning you have symptoms of withdrawal when the medication is stopped. Withdrawal symptoms can include nausea, sweating, chills, diarrhea, stomach cramps, and muscle aches. Withdrawal can last up to several weeks, depending on which drug you took and how long you took it. · Increased sensitivity to pain · Constipation · Nausea, vomiting, and dry mouth · Sleepiness and dizziness · Confusion · Depression · Low levels of testosterone that can result in lower sex drive, energy, and strength · Itching and sweating RISKS ARE GREATER WITH:      
· History of drug misuse, substance use disorder, or overdose · Mental health conditions (such as depression or anxiety) · Sleep apnea · Older age (72 years or older) · Pregnancy Avoid alcohol while taking prescription opioids. Also, unless specifically advised by your health care provider, medications to avoid include: · Benzodiazepines (such as Xanax or Valium) · Muscle relaxants (such as Soma or Flexeril) · Hypnotics (such as Ambien or Lunesta) · Other prescription opioids KNOW YOUR OPTIONS Talk to your health care provider about ways to manage your pain that don't involve prescription opioids. Some of these options may actually work better and have fewer risks and side effects. Options may include: 
· Pain relievers such as acetaminophen, ibuprofen, and naproxen · Some medications that are also used for depression or seizures · Physical therapy and exercise · Counseling to help patients learn how to cope better with triggers of pain and stress. · Application of heat or cold compress · Massage therapy · Relaxation techniques Be Informed Make sure you know the name of your medication, how much and how often to take it, and its potential risks & side effects. IF YOU ARE PRESCRIBED OPIOIDS FOR PAIN: 
· Never take opioids in greater amounts or more often than prescribed. Remember the goal is not to be pain-free but to manage your pain at a tolerable level. · Follow up with your primary care provider to: · Work together to create a plan on how to manage your pain. · Talk about ways to help manage your pain that don't involve prescription opioids. · Talk about any and all concerns and side effects. · Help prevent misuse and abuse. · Never sell or share prescription opioids · Help prevent misuse and abuse. · Store prescription opioids in a secure place and out of reach of others (this may include visitors, children, friends, and family). · Safely dispose of unused/unwanted prescription opioids: Find your community drug take-back program or your pharmacy mail-back program, or flush them down the toilet, following guidance from the Food and Drug Administration (www.fda.gov/Drugs/ResourcesForYou). · Visit www.cdc.gov/drugoverdose to learn about the risks of opioid abuse and overdose. · If you believe you may be struggling with addiction, tell your health care provider and ask for guidance or call [address] [place] [address] at 2-441-651-SKDD. Discharge Instructions Bárbara Arceo MD 
[address] [facility] Office Phone: [phone] Neck Surgery Discharge Instructions Activities ? You are going home a well person, be as active as possible. Your only exercise should be walking. Start with short frequent walks and increase your walking distance each day. Start with walking twice a day for 5 minutes and increase your distance each day 2-3 minutes until you reach 25 minutes twice a day. Limit the amount of time you sit to 20-30 minute intervals. Sitting for prolonged periods of time will be uncomfortable for you following your surgery. ? Do not lift anything over five pounds, and do not do any bending or straining. ? Avoid reaching overhead in this post-operative period ? Do not do any neck exercises until you have been instructed by your doctor. ? When you are in the bed, you may lay on your back or on either side. Do not lie on your stomach. ? Continue using your incentive spirometer regularly for deep breathing exercises ? You may resume sexual relations 2 weeks after your surgery Diet ? You may resume your normal diet. If your throat is sore, you may want to eat soft foods for a few days. Be sure to drink plenty of fluids, it is important to keep yourself hydrated. If you begin having trouble swallowing, call the office immediately. ? Avoid alcoholic beverages and ABSOLUTELY NO tobacco products. Tobacco products will interfere with your healing. If you continue to use tobacco, you may end up needing another surgery in the future. Medications ? Do not take anti-inflammatory medications or aspirin unless instructed by your physician. ? Take your pain medication as directed. ? Do NOT take additional Tylenol if your prescribed pain medication has acetaminophen in it (Endocet/Percocet, Lortab, Norco). ? It is important to have regular bowel movements. Pain medications may cause constipation. Stool softeners, prune juice, and increasing your water and fiber intake may help in preventing constipation. ? Do NOT take laxatives if at all possible except in severe situations. It can results in a vicious cycle of constipation and diarrhea. ? Do not be alarmed if you still have some of the same symptoms you had prior to surgery. The nerves often require time to heal after the pressure has been relieved. You may experience pain in your shoulders or between your shoulder blades, which is common after this surgery. The level of pain you experience should improve as your body heals. Driving ? You may not drive or return to work until instructed by your physician. However, you may ride in the car for short periods of time. Neck collar ? Wear your neck brace. You may remove it for short breaks, when eating or showering. You must keep the brace on while sleeping and when ambulating. Showering ? You may shower in approximately 5 days after your surgery if your incision is not draining. ? You may remove your brace during showers. ? Do not rub or apply lotion or ointments to the incision site. ? Do not use tub baths, swimming pools or Jacuzzis. Caring for your incision ? Keep the clear, plastic dressing on until 3 days after surgery.  At that point, if the incision is dry and without drainage, you may keep the wound open to air without cover. ? You may have steri-strips on your incision (small, white pieces of tape). Do not pull the steri-strips; they will fall off on their own after several days. If you have sutures or staples, they will be removed by home health or when you see your physician. ? Do not rub or apply any lotions or ointments to your incision site. Stockings ? You have been given T.E.D. stockings to wear. Continue to wear these for 7 days after your discharge. Put them on in the morning and take them off at night. They are used to increase your circulation and prevent blood clots from forming in your legs. Follow Up 
? Once you are home, call your physicians office to schedule an appointment 3 weeks after surgery. Notify your physician if you develop any of the following conditions: 
? Fever above 101 degrees for 24 hours. ? Nausea or vomiting. ? Severe headache. 
? Inability to urinate. ? Loss of bowel or bladder control (sudden onset of incontinence). ? Changes in sensation in your extremities (numbness, tingling, loss of color). ? Severe pain or pain not relieved by medications. ? Redness, swelling, or drainage from your incision. ? Persistent pain in the chest.  
? Pain in the calf of either leg. 
? Increased weakness (if this is greater than before your surgery). Solar Census Activation Thank you for enrolling in [facility]. Please follow the instructions below to securely access your online medical record. Solar Census allows you to send messages to your doctor, view your test results, renew your prescriptions, schedule appointments, and more. How Do I Sign Up? 1. In your internet browser, go to [email or web address]. 2. Click on the First Time User? Click Here link in the Sign In box. You will see the New Member Sign Up page. 3. Enter your Solar Census Access Code exactly as it appears below.  You will not need to use this code after youve completed the sign-up process. If you do not sign up before the expiration date, you must request a new code. Funinhand Access Code: [email or web address] Expires: 10/1/2018  8:26 AM  
 
4. Enter the last four digits of your Social Security Number (xxxx) and Date of Birth (mm/dd/yyyy) as indicated and click Submit. You will be taken to the next sign-up page. 5. Create a Nuevorat ID. This will be your Funinhand login ID and cannot be changed, so think of one that is secure and easy to remember. 6. Create a Funinhand password. You can change your password at any time. 7. Enter your Password Reset Question and Answer. This can be used at a later time if you forget your password. 8. Enter your e-mail address. You will receive e-mail notification when new information is available in [address] 9. Click Sign Up. You can now view your medical record. Additional Information Remember, Funinhand is NOT to be used for urgent needs. For medical emergencies, dial 911. Now available from your iPhone and Android! Introducing [facility]! [facility] introduces Funinhand patient portal. Now you can access parts of your medical record, email your doctor's office, and request medication refills online. 1. In your internet browser, go to [email or web address] 2. Click on the First Time User? Click Here link in the Sign In box. You will see the New Member Sign Up page. 3. Enter your Funinhand Access Code exactly as it appears below. You will not need to use this code after youve completed the sign-up process. If you do not sign up before the expiration date, you must request a new code. · Funinhand Access Code: [email or web address] Expires: 10/1/2018  8:26 AM 
 
4. Enter the last four digits of your Social Security Number (xxxx) and Date of Birth (mm/dd/yyyy) as indicated and click Submit. You will be taken to the next sign-up page. 5. Create a Catbird ID. This will be your Catbird login ID and cannot be changed, so think of one that is secure and easy to remember. 6. Create a Catbird password. You can change your password at any time. 7. Enter your Password Reset Question and Answer. This can be used at a later time if you forget your password. 8. Enter your e-mail address. You will receive e-mail notification when new information is available in [address] 9. Click Sign Up. You can now view and download portions of your medical record. 10. Click the Download Summary menu link to download a portable copy of your medical information. If you have questions, please visit the Frequently Asked Questions section of the Catbird website. Remember, Catbird is NOT to be used for urgent needs. For medical emergencies, dial 911. Now available from your iPhone and Android! Introducing Diego Turner As a [facility] patient, I wanted to make you aware of our electronic visit tool called Diego Augustinedivyanelsy. [facility] 24/7 allows you to connect within minutes with a medical provider 24 hours a day, seven days a week via a mobile device or tablet or logging into a secure website from your computer. You can access Diego Webbfin from anywhere in the [place]. A virtual visit might be right for you when you have a simple condition and feel like you just dont want to get out of bed, or cant get away from work for an appointment, when your regular [facility] provider is not available (evenings, weekends or holidays), or when youre out of town and need minor care. Electronic visits cost only $49 and if the [facility] 24/7 provider determines a prescription is needed to treat your condition, one can be electronically transmitted to a nearby pharmacy*. Please take a moment to enroll today if you have not already done so. The enrollment process is free and takes just a few minutes.   To enroll, please download the Ameri-tech 3D 24/7 elizabeth to your tablet or phone, or visit [email or web address] to enroll on your computer. And, as an [address] [place] [address] patient with a Adviqo account, the results of your visits will be scanned into your electronic medical record and your primary care provider will be able to view the scanned results. We urge you to continue to see your regular Ameri-tech 3D provider for your ongoing medical care. And while your primary care provider may not be the one available when you seek a Ziegler virtual visit, the peace of mind you get from getting a real diagnosis real time can be priceless. For more information on Ziegler, view our Frequently Asked Questions (FAQs) at [email or web address]. Sincerely, 
 
David Peres MD 
Chief Medical Officer [address] *:  certain medications cannot be prescribed via Ziegler Providers Seen During Your Hospitalization Provider Specialty Primary office phone Tg Pacheco MD Orthopedic Surgery [phone] Your Primary Care Physician (PCP) Primary Care Physician Office Phone Office Fax [place] [phone] You are allergic to the following No active allergies Recent Documentation Height Weight BMI Smoking Status 1.765 m 142.3 kg 45.66 kg/m2 Former Smoker Emergency Contacts Name Discharge Info Relation Home Work Mobile [address] [employer]  CAREGIVER [3] Spouse [3] [phone] Patient Belongings The following personal items are in your possession at time of discharge: 
  Dental Appliances: None  Visual Aid: Glasses   Hearing Aids/Status: Does not own  Home Medications: None   Jewelry: None  Clothing: Footwear, Shirt, Pants, Undergarments    Other Valuables: None Please provide this summary of care documentation to your next provider. Signatures-by signing, you are acknowledging that this After Visit Summary has been reviewed with you and you have received a copy. Patient Signature:  ____________________________________________________________ Date:  ____________________________________________________________  
  
Jacqlyn Newcomer Provider Signature:  ____________________________________________________________ Date:  ____________________________________________________________

## 2018-08-06 NOTE — PROGRESS NOTES
Bedside and Verbal shift change report given to [address] Bill RN (oncoming nurse) by Blessing Bonilla RN (offgoing nurse). Report included the following information SBAR, Kardex, MAR and Recent Results.      Primary Nurse Salvador Delatorre RN and [address] YOVANA Khan performed a dual skin assessment on this patient No impairment noted  Ruslan score is 21

## 2018-08-06 NOTE — OP NOTES
[address]    OPERATIVE REPORT      NAME: Stephie Chase. AGE: 47 y.o. YOB: 1965    MEDICAL RECORD NUMBER: [id number]    DATE OF SURGERY: 8/6/2018    OPERATIVE REPORT     PREOPERATIVE DIAGNOSIS: Cervical stenosis     POSTOPERATIVE DIAGNOSIS: Cervical stenosis    OPERATIVE PROCEDURE: C6 to C7 anterior cervical diskectomy and fusion with instrumentation and application of interbody spacer at C6-C7    SURGEON: Concepcion Garnett MD     ASSISTANT: AARON Babcock     ANESTHESIA: General    COMPLICATIONS: None    ESTIMATED BLOOD LOSS: 50    INSTRUMENTATION: K2M plate, seaspine spacer    INDICATION FOR PROCEDURE: The patient is a very pleasant 47 y.o. male with cervical stenosis. The patient elected to proceed with operative intervention. He was aware of the risks, benefits, and alternatives. He was provided informed consent. PROCEDURE: The patient was identified in the preoperative holding area. The anterior cervical spine was marked by me. He was transferred to the operating room where general anesthesia was given. He was also given perioperative antibiotics. The patient was placed supine on the operating room table. All bony prominences were well-padded. The shoulders were taped. The anterior cervical spine was prepped and draped in the usual standard fashion. We performed a surgical time-out. I made a skin incision on the left side. It was transverse. I exposed the anterior cervical spine. I placed a needle into the disk space to verify our level. I exposed the disc space with electrocautery from uncus to uncus. I brought in the operating room microscope. I performed a diskectomy at C6-C7. I decompressed the spinal cord and nerve roots bilaterally. I prepared the endplates to bleeding bone. We had good hemostasis. I performed trial sizing.  I placed a spacer into C6-C7 with the appropriate amount of tension and alignment. I then placed an anterior cervical plate into C6 and C7. The screws were locked to the plate according to the manufacture's specification. We had good hemostasis. I copiously irrigated the entire wound. I placed a deep drain. The wound was closed with 3-0 Vicryl and 4-0 Monocryl. A sterile dressing was applied. The patient was extubated and transferred to the recovery room in good medical condition. I, Dr. Mendez Youssef, performed the above procedures.      Mendez Youssef MD  8/6/2018

## 2018-08-06 NOTE — PERIOP NOTES
TRANSFER - OUT REPORT:    Verbal report given to RENEE skelton RN) on [place].  being transferred to [address] [place] [address] for routine post - op       Report consisted of patients Situation, Background, Assessment and   Recommendations(SBAR). Information from the following report(s) SBAR was reviewed with the receiving nurse. Lines:   Peripheral IV 08/06/18 Left Hand (Active)   Site Assessment Clean, dry, & intact 8/6/2018 10:15 AM   Phlebitis Assessment 0 8/6/2018 10:15 AM   Infiltration Assessment 0 8/6/2018 10:15 AM   Dressing Status Clean, dry, & intact 8/6/2018 10:15 AM   Dressing Type Transparent 8/6/2018 10:15 AM   Hub Color/Line Status Green 8/6/2018 10:15 AM   Action Taken Open ports on tubing capped 8/6/2018  6:29 AM   Alcohol Cap Used Yes 8/6/2018  6:29 AM       Peripheral IV 08/06/18 Right Hand (Active)   Site Assessment Clean, dry, & intact 8/6/2018 10:15 AM   Phlebitis Assessment 0 8/6/2018 10:15 AM   Infiltration Assessment 0 8/6/2018 10:15 AM   Dressing Status Clean, dry, & intact 8/6/2018 10:15 AM   Dressing Type Transparent 8/6/2018 10:15 AM   Hub Color/Line Status Pink 8/6/2018 10:15 AM        Opportunity for questions and clarification was provided.       Patient transported with:   O2 @ 2 liters  Registered Nurse

## 2018-08-06 NOTE — PROGRESS NOTES
Reason for Admission:   Cervical Radiculitis, Cervical Disc Herniation                   RRAT Score:   2                Plan for utilizing home health:      Not indicated                    Likelihood of Readmission:  Low                         Transition of Care Plan:    CM met with pt. Charted address was confirmed. He lives with his wife and their three children in a two story house with three entry steps. Pt's bedroom is located on the second floor. Pt does not own any DME. He has no prior HH hx.  Pharmacy is [facility] on [address] [facility] PCP is Dr. Jace Galvan. No discharge needs indicated at this time. Pt has been provided a copy of the observation letter. Stacey Mcgregor LCSW    Care Management Interventions  PCP Verified by CM:  Yes (Dr. Ronney Schlatter)  Discharge Durable Medical Equipment: No  Physical Therapy Consult: No  Occupational Therapy Consult: No  Speech Therapy Consult: No  Current Support Network: Lives with Spouse  Confirm Follow Up Transport: Family  Plan discussed with Pt/Family/Caregiver: Yes  Discharge Location  Discharge Placement: Home with family assistance

## 2018-08-06 NOTE — ANESTHESIA POSTPROCEDURE EVALUATION
Post-Anesthesia Evaluation and Assessment    Patient: Stone Ni. MRN: [id number]  SSN: [id number]    YOB: 1965  Age: 47 y.o. Sex: male       Cardiovascular Function/Vital Signs  Visit Vitals    /79 (BP 1 Location: Right arm)    Pulse 88    Temp 36.6 °C (97.8 °F)    Resp 19    Ht 5' 9.5\" (1.765 m)    Wt 142.3 kg (313 lb 11.4 oz)    SpO2 98%    BMI 45.66 kg/m2       Patient is status post general anesthesia for Procedure(s):  C6-C7 ANTERIOR CERVICAL DISCECTOMY AND FUSION WITH INSTRUMENTATION. Nausea/Vomiting: None    Postoperative hydration reviewed and adequate. Pain:  Pain Scale 1: Numeric (0 - 10) (08/06/18 1015)  Pain Intensity 1: 2 (08/06/18 1015)   Managed    Neurological Status:   Neuro (WDL): Within Defined Limits (no numbness in right hand) (08/06/18 1015)  Neuro  LUE Motor Response: Purposeful (08/06/18 1015)  LLE Motor Response: Purposeful (08/06/18 1015)  RUE Motor Response: Purposeful (08/06/18 1015)  RLE Motor Response: Purposeful (08/06/18 1015)   At baseline    Mental Status and Level of Consciousness: Arousable    Pulmonary Status:   O2 Device: Nasal cannula;Nasal airway (08/06/18 0933)   Adequate oxygenation and airway patent    Complications related to anesthesia: None    Post-anesthesia assessment completed.  No concerns    Signed By: Brittani Wilson MD     August 6, 2018

## 2018-08-06 NOTE — PROGRESS NOTES
Spiritual Care Partner Volunteer visited patient in 45 on 8.6. 18.   Documented by:    Karlie Ling M.Div, M.S, Adriana 59 available at [address] [place] [id number])

## 2018-08-06 NOTE — ANESTHESIA PREPROCEDURE EVALUATION
Anesthetic History   No history of anesthetic complications            Review of Systems / Medical History  Patient summary reviewed, nursing notes reviewed and pertinent labs reviewed    Pulmonary  Within defined limits                 Neuro/Psych   Within defined limits           Cardiovascular  Within defined limits                Exercise tolerance: >4 METS  Comments: Not on beta blocker   GI/Hepatic/Renal  Within defined limits              Endo/Other        Morbid obesity and arthritis     Other Findings   Comments: Right arm pain and numbness  Low back pain  Quit smoking 1 month         Physical Exam    Airway  Mallampati: II  TM Distance: 4 - 6 cm  Neck ROM: normal range of motion   Mouth opening: Normal     Cardiovascular  Regular rate and rhythm,  S1 and S2 normal,  no murmur, click, rub, or gallop  Rhythm: regular  Rate: normal         Dental  No notable dental hx       Pulmonary  Breath sounds clear to auscultation               Abdominal  GI exam deferred       Other Findings            Anesthetic Plan    ASA: 3  Anesthesia type: general          Induction: Intravenous  Anesthetic plan and risks discussed with: Patient

## 2018-08-07 VITALS
SYSTOLIC BLOOD PRESSURE: 137 MMHG | OXYGEN SATURATION: 97 % | TEMPERATURE: 98.4 F | BODY MASS INDEX: 44.91 KG/M2 | RESPIRATION RATE: 16 BRPM | HEART RATE: 80 BPM | DIASTOLIC BLOOD PRESSURE: 67 MMHG | WEIGHT: 313.71 LBS | HEIGHT: 70 IN

## 2018-08-07 LAB
ANION GAP SERPL CALC-SCNC: 10 MMOL/L (ref 5–15)
BUN SERPL-MCNC: 11 MG/DL (ref 6–20)
BUN/CREAT SERPL: 9 (ref 12–20)
CALCIUM SERPL-MCNC: 8.8 MG/DL (ref 8.5–10.1)
CHLORIDE SERPL-SCNC: 106 MMOL/L (ref 97–108)
CO2 SERPL-SCNC: 23 MMOL/L (ref 21–32)
CREAT SERPL-MCNC: 1.17 MG/DL (ref 0.7–1.3)
GLUCOSE SERPL-MCNC: 164 MG/DL (ref 65–100)
HGB BLD-MCNC: 13.4 G/DL (ref 12.1–17)
POTASSIUM SERPL-SCNC: 4.2 MMOL/L (ref 3.5–5.1)
SODIUM SERPL-SCNC: 139 MMOL/L (ref 136–145)

## 2018-08-07 PROCEDURE — 99218 HC RM OBSERVATION: CPT

## 2018-08-07 PROCEDURE — 77010033678 HC OXYGEN DAILY

## 2018-08-07 PROCEDURE — 74011250637 HC RX REV CODE- 250/637: Performed by: ORTHOPAEDIC SURGERY

## 2018-08-07 PROCEDURE — 80048 BASIC METABOLIC PNL TOTAL CA: CPT | Performed by: ORTHOPAEDIC SURGERY

## 2018-08-07 PROCEDURE — 77030027138 HC INCENT SPIROMETER -A

## 2018-08-07 PROCEDURE — 85018 HEMOGLOBIN: CPT | Performed by: ORTHOPAEDIC SURGERY

## 2018-08-07 PROCEDURE — 94760 N-INVAS EAR/PLS OXIMETRY 1: CPT

## 2018-08-07 PROCEDURE — 74011250636 HC RX REV CODE- 250/636: Performed by: ORTHOPAEDIC SURGERY

## 2018-08-07 PROCEDURE — 36415 COLL VENOUS BLD VENIPUNCTURE: CPT | Performed by: ORTHOPAEDIC SURGERY

## 2018-08-07 RX ORDER — DOCUSATE SODIUM 100 MG/1
100 CAPSULE, LIQUID FILLED ORAL 2 TIMES DAILY
Qty: 60 CAP | Refills: 0 | Status: SHIPPED | OUTPATIENT
Start: 2018-08-07 | End: 2018-12-22

## 2018-08-07 RX ORDER — CYCLOBENZAPRINE HCL 10 MG
10 TABLET ORAL
Qty: 30 TAB | Refills: 0 | Status: SHIPPED | OUTPATIENT
Start: 2018-08-07 | End: 2018-12-22

## 2018-08-07 RX ORDER — PROMETHAZINE HYDROCHLORIDE 25 MG/1
25 TABLET ORAL
Qty: 30 TAB | Refills: 0 | Status: SHIPPED | OUTPATIENT
Start: 2018-08-07 | End: 2019-01-08 | Stop reason: ALTCHOICE

## 2018-08-07 RX ORDER — OXYCODONE AND ACETAMINOPHEN 5; 325 MG/1; MG/1
1-2 TABLET ORAL
Qty: 80 TAB | Refills: 0 | Status: SHIPPED | OUTPATIENT
Start: 2018-08-07

## 2018-08-07 RX ADMIN — Medication 10 ML: at 06:52

## 2018-08-07 RX ADMIN — STANDARDIZED SENNA CONCENTRATE AND DOCUSATE SODIUM 1 TABLET: 8.6; 5 TABLET, FILM COATED ORAL at 09:33

## 2018-08-07 RX ADMIN — SODIUM CHLORIDE 125 ML/HR: 900 INJECTION, SOLUTION INTRAVENOUS at 02:08

## 2018-08-07 RX ADMIN — POLYETHYLENE GLYCOL 3350 17 G: 17 POWDER, FOR SOLUTION ORAL at 09:32

## 2018-08-07 RX ADMIN — FAMOTIDINE 20 MG: 20 TABLET ORAL at 09:32

## 2018-08-07 RX ADMIN — Medication 2 G: at 06:52

## 2018-08-07 NOTE — DISCHARGE INSTRUCTIONS
Aminata Moseley MD  [address] [facility]  Office Phone: [phone]  Neck Surgery Discharge Instructions  Activities   You are going home a well person, be as active as possible. Your only exercise should be walking. Start with short frequent walks and increase your walking distance each day. Start with walking twice a day for 5 minutes and increase your distance each day 2-3 minutes until you reach 25 minutes twice a day. Limit the amount of time you sit to 20-30 minute intervals. Sitting for prolonged periods of time will be uncomfortable for you following your surgery.  Do not lift anything over five pounds, and do not do any bending or straining.  Avoid reaching overhead in this post-operative period   Do not do any neck exercises until you have been instructed by your doctor.  When you are in the bed, you may lay on your back or on either side. Do not lie on your stomach.  Continue using your incentive spirometer regularly for deep breathing exercises   You may resume sexual relations 2 weeks after your surgery    Diet   You may resume your normal diet. If your throat is sore, you may want to eat soft foods for a few days. Be sure to drink plenty of fluids, it is important to keep yourself hydrated. If you begin having trouble swallowing, call the office immediately.  Avoid alcoholic beverages and ABSOLUTELY NO tobacco products. Tobacco products will interfere with your healing. If you continue to use tobacco, you may end up needing another surgery in the future. Medications   Do not take anti-inflammatory medications or aspirin unless instructed by your physician.  Take your pain medication as directed.  Do NOT take additional Tylenol if your prescribed pain medication has acetaminophen in it (Endocet/Percocet, Lortab, Norco).  It is important to have regular bowel movements. Pain medications may cause constipation.   Stool softeners, prune juice, and increasing your water and fiber intake may help in preventing constipation.  Do NOT take laxatives if at all possible except in severe situations. It can results in a vicious cycle of constipation and diarrhea.  Do not be alarmed if you still have some of the same symptoms you had prior to surgery. The nerves often require time to heal after the pressure has been relieved. You may experience pain in your shoulders or between your shoulder blades, which is common after this surgery. The level of pain you experience should improve as your body heals. Driving   You may not drive or return to work until instructed by your physician. However, you may ride in the car for short periods of time. Neck collar   Wear your neck brace. You may remove it for short breaks, when eating or showering. You must keep the brace on while sleeping and when ambulating. Showering   You may shower in approximately 5 days after your surgery if your incision is not draining.  You may remove your brace during showers.  Do not rub or apply lotion or ointments to the incision site.  Do not use tub baths, swimming pools or Jacuzzis. Caring for your incision   Keep the clear, plastic dressing on until 3 days after surgery. At that point, if the incision is dry and without drainage, you may keep the wound open to air without cover.  You may have steri-strips on your incision (small, white pieces of tape). Do not pull the steri-strips; they will fall off on their own after several days. If you have sutures or staples, they will be removed by home health or when you see your physician.  Do not rub or apply any lotions or ointments to your incision site. Stockings   You have been given T.E.D. stockings to wear. Continue to wear these for 7 days after your discharge. Put them on in the morning and take them off at night.   They are used to increase your circulation and prevent blood clots from forming in your legs.    Follow Up   Once you are home, call your physicians office to schedule an appointment 3 weeks after surgery. Notify your physician if you develop any of the following conditions:   Fever above 101 degrees for 24 hours.  Nausea or vomiting.  Severe headache.  Inability to urinate.  Loss of bowel or bladder control (sudden onset of incontinence).  Changes in sensation in your extremities (numbness, tingling, loss of color).  Severe pain or pain not relieved by medications.  Redness, swelling, or drainage from your incision.  Persistent pain in the chest.    Pain in the calf of either leg.  Increased weakness (if this is greater than before your surgery). MyChart Activation    Thank you for enrolling in [facility]. Please follow the instructions below to securely access your online medical record. [employer] allows you to send messages to your doctor, view your test results, renew your prescriptions, schedule appointments, and more. How Do I Sign Up? 1. In your internet browser, go to [email or web address]. 2. Click on the First Time User? Click Here link in the Sign In box. You will see the New Member Sign Up page. 3. Enter your [employer] Access Code exactly as it appears below. You will not need to use this code after youve completed the sign-up process. If you do not sign up before the expiration date, you must request a new code. [employer] Access Code: [email or web address]  Expires: 10/1/2018  8:26 AM     4. Enter the last four digits of your Social Security Number (xxxx) and Date of Birth (mm/dd/yyyy) as indicated and click Submit. You will be taken to the next sign-up page. 5. Create a [employer] ID. This will be your [employer] login ID and cannot be changed, so think of one that is secure and easy to remember. 6. Create a [employer] password. You can change your password at any time. 7. Enter your Password Reset Question and Answer.  This can be used at a later time if you forget your password. 8. Enter your e-mail address. You will receive e-mail notification when new information is available in [address] 9. Click Sign Up. You can now view your medical record. Additional Information    Remember, "[employer]" is NOT to be used for urgent needs. For medical emergencies, dial 911. Now available from your iPhone and Android!

## 2018-08-07 NOTE — PROGRESS NOTES
ORTHOPAEDIC CERVICAL FUSION PROGRESS NOTE    NAME:     Jaycee Garcia :       1965   MRN:       [id number]   DATE:      2018    POD:              1 Day Post-Op  S/P:              Procedure(s):  C6-C7 ANTERIOR CERVICAL DISCECTOMY AND FUSION WITH INSTRUMENTATION    SUBJECTIVE:  C/O sore throat   No arm pain or numbness  Denies nausea/vomiting, headache, chest pain or shortness of breath  Pain controlled    Recent Labs      18   0303   HGB  13.4   NA  139   K  4.2   CL  106   CO2  23   BUN  11   CREA  1.17   GLU  164*     Patient Vitals for the past 12 hrs:   BP Temp Pulse Resp SpO2   18 0744 140/71 98.9 °F (37.2 °C) 80 16 96 %   18 0315 124/77 99.2 °F (37.3 °C) 99 18 95 %   18 2335 119/74 99.4 °F (37.4 °C) (!) 107 16 96 %       Exam:  Soft collar inplace / intact  Dressings clean and dry  Positive strength/ROM bilat upper ext.   Neuro intact to sensation  BL UEs NVID    PLAN:  Continue PO pain medications as needed  Chloraseptic spray at bedside  Advance diet as tolerated  Out of bed w/ assist  Likely D/C to home today      Leslie Schofield [place]  Orthopaedic Surgery  Physician Assistant to Dr. Aline Britton

## 2018-08-07 NOTE — PROGRESS NOTES
Discharge instructions reviewed with and copy given to patient along with prescriptions.  PAtient verbalizes understanding of instructions with no questions offered at this time

## 2018-08-27 NOTE — DISCHARGE SUMMARY
DISCHARGE SUMMARY     Patient: Minoo Dior Medical Record Number: [id number]                : 1965  Age: 47 y.o. Admit Date: 2018  Discharge Date: 2018  Admission Diagnosis: CERVICAL RADICULITIS, CERVICAL DISC HERNIATION  Cervical stenosis of spinal canal  Discharge Diagnosis: CERVICAL RADICULITIS, CERVICAL DISC HERNIATION  Procedures: Procedure(s):  C6-C7 ANTERIOR CERVICAL DISCECTOMY AND FUSION WITH INSTRUMENTATION  Surgeon: Alisha Ayala MD  Anesthesia: General  Complications: None     History of Present Illness:  Minoo Dior is a 47 y.o. male with a history of intractable neck pain and radiculopathy. Despite conservative management and after clinical and radiographic evaluation, it was determined that he suffered from cervical stenosis and would benefit from Procedure(s):  C6-C7 ANTERIOR CERVICAL DISCECTOMY AND FUSION WITH INSTRUMENTATION, which he consented to undergo after a discussion of the risks, benefits, alternatives, rehab concerns, and potential complications of surgery. Hospital Course:  Minoo Dior tolerated the procedure well. He was transferred  to the recovery room in stable condition. After a brief stay, the patient was then transferred to the Orthopedic floor. On postoperative day #1, the dressing was clean and dry and he was neurovascularly intact. The patient was afebrile and vital signs were stable. Minoo Dior was deemed able to be discharged to Home  in stable condition on postoperative day 1. He was provided with routine postoperative instructions and advised to follow up in my office in 3 weeks following discharge from the hospital.  He was given a brace and prescriptions for medication to control post-operative symptoms.     Discharge Medications:  Discharge Medication List as of 2018 12:05 PM      START taking these medications    Details   oxyCODONE-acetaminophen (PERCOCET) 5-325 mg per tablet Take 1-2 Tabs by mouth every six (6) hours as needed for Pain. Max Daily Amount: 8 Tabs., Print, Disp-80 Tab, R-0      promethazine (PHENERGAN) 25 mg tablet Take 1 Tab by mouth every six (6) hours as needed for Nausea. , Print, Disp-30 Tab, R-0      docusate sodium (COLACE) 100 mg capsule Take 1 Cap by mouth two (2) times a day., Print, Disp-60 Cap, R-0      cyclobenzaprine (FLEXERIL) 10 mg tablet Take 1 Tab by mouth three (3) times daily as needed for Muscle Spasm(s). , Print, Disp-30 Tab, R-0         STOP taking these medications       acetaminophen (TYLENOL EXTRA STRENGTH) 500 mg tablet Comments:   Reason for Stopping:               Clay Saunders  8/7/2018  Orthopaedic Surgery  Physician Assistant to Dr. Gurwinder Moore

## 2018-12-22 ENCOUNTER — APPOINTMENT (OUTPATIENT)
Dept: CT IMAGING | Age: 53
DRG: 176 | End: 2018-12-22
Attending: STUDENT IN AN ORGANIZED HEALTH CARE EDUCATION/TRAINING PROGRAM
Payer: COMMERCIAL

## 2018-12-22 ENCOUNTER — APPOINTMENT (OUTPATIENT)
Dept: GENERAL RADIOLOGY | Age: 53
DRG: 176 | End: 2018-12-22
Attending: PHYSICIAN ASSISTANT
Payer: COMMERCIAL

## 2018-12-22 ENCOUNTER — APPOINTMENT (OUTPATIENT)
Dept: GENERAL RADIOLOGY | Age: 53
DRG: 176 | End: 2018-12-22
Attending: STUDENT IN AN ORGANIZED HEALTH CARE EDUCATION/TRAINING PROGRAM
Payer: COMMERCIAL

## 2018-12-22 ENCOUNTER — HOSPITAL ENCOUNTER (INPATIENT)
Age: 53
LOS: 2 days | Discharge: HOME | DRG: 176 | End: 2018-12-24
Attending: STUDENT IN AN ORGANIZED HEALTH CARE EDUCATION/TRAINING PROGRAM | Admitting: INTERNAL MEDICINE
Payer: COMMERCIAL

## 2018-12-22 DIAGNOSIS — G89.4 CHRONIC PAIN SYNDROME: ICD-10-CM

## 2018-12-22 DIAGNOSIS — E66.01 MORBID OBESITY WITH BMI OF 45.0-49.9, ADULT (HCC): ICD-10-CM

## 2018-12-22 DIAGNOSIS — R91.8 PULMONARY NODULES: ICD-10-CM

## 2018-12-22 DIAGNOSIS — R09.02 HYPOXIA: ICD-10-CM

## 2018-12-22 DIAGNOSIS — I26.99 OTHER ACUTE PULMONARY EMBOLISM WITHOUT ACUTE COR PULMONALE (HCC): Primary | ICD-10-CM

## 2018-12-22 LAB
ALBUMIN SERPL-MCNC: 3.4 G/DL (ref 3.5–5)
ALBUMIN/GLOB SERPL: 0.8 {RATIO} (ref 1.1–2.2)
ALP SERPL-CCNC: 66 U/L (ref 45–117)
ALT SERPL-CCNC: 39 U/L (ref 12–78)
AMPHET UR QL SCN: NEGATIVE
ANION GAP SERPL CALC-SCNC: 9 MMOL/L (ref 5–15)
APPEARANCE UR: CLEAR
APTT PPP: 31.3 SEC (ref 22.1–32)
ARTERIAL PATENCY WRIST A: YES
AST SERPL-CCNC: 34 U/L (ref 15–37)
BACTERIA URNS QL MICRO: NEGATIVE /HPF
BARBITURATES UR QL SCN: NEGATIVE
BASE DEFICIT BLDA-SCNC: 3.7 MMOL/L
BASOPHILS # BLD: 0 K/UL (ref 0–0.1)
BASOPHILS NFR BLD: 0 % (ref 0–1)
BDY SITE: ABNORMAL
BENZODIAZ UR QL: NEGATIVE
BILIRUB SERPL-MCNC: 0.5 MG/DL (ref 0.2–1)
BILIRUB UR QL: NEGATIVE
BNP SERPL-MCNC: 16 PG/ML (ref 0–125)
BUN SERPL-MCNC: 16 MG/DL (ref 6–20)
BUN/CREAT SERPL: 13 (ref 12–20)
CALCIUM SERPL-MCNC: 8.6 MG/DL (ref 8.5–10.1)
CANNABINOIDS UR QL SCN: NEGATIVE
CHLORIDE SERPL-SCNC: 102 MMOL/L (ref 97–108)
CHOLEST SERPL-MCNC: 162 MG/DL
CO2 SERPL-SCNC: 26 MMOL/L (ref 21–32)
COCAINE UR QL SCN: NEGATIVE
COLOR UR: ABNORMAL
CREAT SERPL-MCNC: 1.28 MG/DL (ref 0.7–1.3)
CREAT UR-MCNC: 67.29 MG/DL
D DIMER PPP FEU-MCNC: 5.49 MG/L FEU (ref 0–0.65)
DIFFERENTIAL METHOD BLD: ABNORMAL
DRUG SCRN COMMENT,DRGCM: NORMAL
EOSINOPHIL # BLD: 1.4 K/UL (ref 0–0.4)
EOSINOPHIL NFR BLD: 12 % (ref 0–7)
EPITH CASTS URNS QL MICRO: ABNORMAL /LPF
ERYTHROCYTE [DISTWIDTH] IN BLOOD BY AUTOMATED COUNT: 13.1 % (ref 11.5–14.5)
EST. AVERAGE GLUCOSE BLD GHB EST-MCNC: 154 MG/DL
ETHANOL SERPL-MCNC: <10 MG/DL
FIO2 ON VENT: 100 %
GLOBULIN SER CALC-MCNC: 4.3 G/DL (ref 2–4)
GLUCOSE SERPL-MCNC: 215 MG/DL (ref 65–100)
GLUCOSE UR STRIP.AUTO-MCNC: 250 MG/DL
HAV IGM SER QL: NONREACTIVE
HBA1C MFR BLD: 7 % (ref 4.2–6.3)
HBV CORE IGM SER QL: NONREACTIVE
HBV SURFACE AG SER QL: <0.1 INDEX
HBV SURFACE AG SER QL: NEGATIVE
HCO3 BLDA-SCNC: 22 MMOL/L (ref 22–26)
HCT VFR BLD AUTO: 42.4 % (ref 36.6–50.3)
HCV AB SERPL QL IA: NONREACTIVE
HCV COMMENT,HCGAC: NORMAL
HDLC SERPL-MCNC: 37 MG/DL
HDLC SERPL: 4.4 {RATIO} (ref 0–5)
HGB BLD-MCNC: 13.8 G/DL (ref 12.1–17)
HGB UR QL STRIP: ABNORMAL
HYALINE CASTS URNS QL MICRO: ABNORMAL /LPF (ref 0–5)
IMM GRANULOCYTES # BLD: 0.1 K/UL (ref 0–0.04)
IMM GRANULOCYTES NFR BLD AUTO: 1 % (ref 0–0.5)
INR PPP: 1 (ref 0.9–1.1)
KETONES UR QL STRIP.AUTO: NEGATIVE MG/DL
LACTATE SERPL-SCNC: 3.1 MMOL/L (ref 0.4–2)
LDH SERPL L TO P-CCNC: 467 U/L (ref 85–241)
LDLC SERPL CALC-MCNC: 95 MG/DL (ref 0–100)
LEUKOCYTE ESTERASE UR QL STRIP.AUTO: NEGATIVE
LIPID PROFILE,FLP: ABNORMAL
LYMPHOCYTES # BLD: 4.1 K/UL (ref 0.8–3.5)
LYMPHOCYTES NFR BLD: 36 % (ref 12–49)
MAGNESIUM SERPL-MCNC: 1.9 MG/DL (ref 1.6–2.4)
MCH RBC QN AUTO: 29.1 PG (ref 26–34)
MCHC RBC AUTO-ENTMCNC: 32.5 G/DL (ref 30–36.5)
MCV RBC AUTO: 89.5 FL (ref 80–99)
METHADONE UR QL: NEGATIVE
MONOCYTES # BLD: 0.6 K/UL (ref 0–1)
MONOCYTES NFR BLD: 5 % (ref 5–13)
NEUTS SEG # BLD: 5.3 K/UL (ref 1.8–8)
NEUTS SEG NFR BLD: 46 % (ref 32–75)
NITRITE UR QL STRIP.AUTO: NEGATIVE
NRBC # BLD: 0 K/UL (ref 0–0.01)
NRBC BLD-RTO: 0 PER 100 WBC
OPIATES UR QL: NEGATIVE
OSMOLALITY UR: 482 MOSM/KG H2O
PCO2 BLDA: 41 MMHG (ref 35–45)
PCP UR QL: NEGATIVE
PH BLDA: 7.35 [PH] (ref 7.35–7.45)
PH UR STRIP: 6 [PH] (ref 5–8)
PLATELET # BLD AUTO: 234 K/UL (ref 150–400)
PLATELET COMMENTS,PCOM: ABNORMAL
PMV BLD AUTO: 9.6 FL (ref 8.9–12.9)
PO2 BLDA: 133 MMHG (ref 80–100)
POTASSIUM SERPL-SCNC: 4.1 MMOL/L (ref 3.5–5.1)
PROT SERPL-MCNC: 7.7 G/DL (ref 6.4–8.2)
PROT UR STRIP-MCNC: 30 MG/DL
PROT UR-MCNC: 45 MG/DL (ref 0–11.9)
PROTHROMBIN TIME: 10.5 SEC (ref 9–11.1)
RBC # BLD AUTO: 4.74 M/UL (ref 4.1–5.7)
RBC #/AREA URNS HPF: ABNORMAL /HPF (ref 0–5)
RBC MORPH BLD: ABNORMAL
SAO2 % BLD: 99 % (ref 92–97)
SAO2% DEVICE SAO2% SENSOR NAME: ABNORMAL
SODIUM SERPL-SCNC: 137 MMOL/L (ref 136–145)
SODIUM UR-SCNC: 91 MMOL/L
SP GR UR REFRACTOMETRY: 1.01 (ref 1–1.03)
SP1: NORMAL
SP2: NORMAL
SP3: NORMAL
SPECIMEN SITE: ABNORMAL
THERAPEUTIC RANGE,PTTT: NORMAL SECS (ref 58–77)
TRIGL SERPL-MCNC: 150 MG/DL (ref ?–150)
TROPONIN I SERPL-MCNC: 0.09 NG/ML
TROPONIN I SERPL-MCNC: 1.86 NG/ML
UROBILINOGEN UR QL STRIP.AUTO: 0.2 EU/DL (ref 0.2–1)
VIT B12 SERPL-MCNC: 537 PG/ML (ref 193–986)
VLDLC SERPL CALC-MCNC: 30 MG/DL
WBC # BLD AUTO: 11.5 K/UL (ref 4.1–11.1)
WBC URNS QL MICRO: ABNORMAL /HPF (ref 0–4)

## 2018-12-22 PROCEDURE — 36415 COLL VENOUS BLD VENIPUNCTURE: CPT

## 2018-12-22 PROCEDURE — 82570 ASSAY OF URINE CREATININE: CPT

## 2018-12-22 PROCEDURE — 84300 ASSAY OF URINE SODIUM: CPT

## 2018-12-22 PROCEDURE — 36600 WITHDRAWAL OF ARTERIAL BLOOD: CPT

## 2018-12-22 PROCEDURE — 83036 HEMOGLOBIN GLYCOSYLATED A1C: CPT

## 2018-12-22 PROCEDURE — 74011636320 HC RX REV CODE- 636/320: Performed by: EMERGENCY MEDICINE

## 2018-12-22 PROCEDURE — 81001 URINALYSIS AUTO W/SCOPE: CPT

## 2018-12-22 PROCEDURE — 71275 CT ANGIOGRAPHY CHEST: CPT

## 2018-12-22 PROCEDURE — 84156 ASSAY OF PROTEIN URINE: CPT

## 2018-12-22 PROCEDURE — 96375 TX/PRO/DX INJ NEW DRUG ADDON: CPT

## 2018-12-22 PROCEDURE — 80053 COMPREHEN METABOLIC PANEL: CPT

## 2018-12-22 PROCEDURE — 85730 THROMBOPLASTIN TIME PARTIAL: CPT

## 2018-12-22 PROCEDURE — 87040 BLOOD CULTURE FOR BACTERIA: CPT

## 2018-12-22 PROCEDURE — 99285 EMERGENCY DEPT VISIT HI MDM: CPT

## 2018-12-22 PROCEDURE — 83615 LACTATE (LD) (LDH) ENZYME: CPT

## 2018-12-22 PROCEDURE — 83880 ASSAY OF NATRIURETIC PEPTIDE: CPT

## 2018-12-22 PROCEDURE — 93005 ELECTROCARDIOGRAM TRACING: CPT

## 2018-12-22 PROCEDURE — 74011250636 HC RX REV CODE- 250/636: Performed by: STUDENT IN AN ORGANIZED HEALTH CARE EDUCATION/TRAINING PROGRAM

## 2018-12-22 PROCEDURE — 83735 ASSAY OF MAGNESIUM: CPT

## 2018-12-22 PROCEDURE — 65270000029 HC RM PRIVATE

## 2018-12-22 PROCEDURE — 84484 ASSAY OF TROPONIN QUANT: CPT

## 2018-12-22 PROCEDURE — 71045 X-RAY EXAM CHEST 1 VIEW: CPT

## 2018-12-22 PROCEDURE — 73030 X-RAY EXAM OF SHOULDER: CPT

## 2018-12-22 PROCEDURE — 80307 DRUG TEST PRSMV CHEM ANLYZR: CPT

## 2018-12-22 PROCEDURE — 83605 ASSAY OF LACTIC ACID: CPT

## 2018-12-22 PROCEDURE — 83935 ASSAY OF URINE OSMOLALITY: CPT

## 2018-12-22 PROCEDURE — 70450 CT HEAD/BRAIN W/O DYE: CPT

## 2018-12-22 PROCEDURE — 80074 ACUTE HEPATITIS PANEL: CPT

## 2018-12-22 PROCEDURE — 87086 URINE CULTURE/COLONY COUNT: CPT

## 2018-12-22 PROCEDURE — 80061 LIPID PANEL: CPT

## 2018-12-22 PROCEDURE — 82803 BLOOD GASES ANY COMBINATION: CPT

## 2018-12-22 PROCEDURE — 74011250636 HC RX REV CODE- 250/636: Performed by: INTERNAL MEDICINE

## 2018-12-22 PROCEDURE — 65660000000 HC RM CCU STEPDOWN

## 2018-12-22 PROCEDURE — 96374 THER/PROPH/DIAG INJ IV PUSH: CPT

## 2018-12-22 PROCEDURE — 85379 FIBRIN DEGRADATION QUANT: CPT

## 2018-12-22 PROCEDURE — 82607 VITAMIN B-12: CPT

## 2018-12-22 PROCEDURE — 85025 COMPLETE CBC W/AUTO DIFF WBC: CPT

## 2018-12-22 PROCEDURE — 85610 PROTHROMBIN TIME: CPT

## 2018-12-22 PROCEDURE — 74011250637 HC RX REV CODE- 250/637: Performed by: INTERNAL MEDICINE

## 2018-12-22 RX ORDER — GABAPENTIN 300 MG/1
300 CAPSULE ORAL 2 TIMES DAILY
Status: DISCONTINUED | OUTPATIENT
Start: 2018-12-22 | End: 2018-12-22

## 2018-12-22 RX ORDER — SODIUM CHLORIDE 0.9 % (FLUSH) 0.9 %
5-10 SYRINGE (ML) INJECTION EVERY 8 HOURS
Status: DISCONTINUED | OUTPATIENT
Start: 2018-12-22 | End: 2018-12-24 | Stop reason: HOSPADM

## 2018-12-22 RX ORDER — GABAPENTIN 300 MG/1
300 CAPSULE ORAL
COMMUNITY
Start: 2018-03-26 | End: 2018-12-22

## 2018-12-22 RX ORDER — OXYCODONE AND ACETAMINOPHEN 5; 325 MG/1; MG/1
1 TABLET ORAL
Status: DISCONTINUED | OUTPATIENT
Start: 2018-12-22 | End: 2018-12-24 | Stop reason: HOSPADM

## 2018-12-22 RX ORDER — FENTANYL CITRATE 50 UG/ML
50 INJECTION, SOLUTION INTRAMUSCULAR; INTRAVENOUS
Status: COMPLETED | OUTPATIENT
Start: 2018-12-22 | End: 2018-12-22

## 2018-12-22 RX ORDER — NALOXONE HYDROCHLORIDE 0.4 MG/ML
0.4 INJECTION, SOLUTION INTRAMUSCULAR; INTRAVENOUS; SUBCUTANEOUS AS NEEDED
Status: DISCONTINUED | OUTPATIENT
Start: 2018-12-22 | End: 2018-12-24 | Stop reason: HOSPADM

## 2018-12-22 RX ORDER — ENOXAPARIN SODIUM 150 MG/ML
150 INJECTION SUBCUTANEOUS
Status: COMPLETED | OUTPATIENT
Start: 2018-12-22 | End: 2018-12-22

## 2018-12-22 RX ORDER — ACETAMINOPHEN 325 MG/1
650 TABLET ORAL
Status: DISCONTINUED | OUTPATIENT
Start: 2018-12-22 | End: 2018-12-24 | Stop reason: HOSPADM

## 2018-12-22 RX ORDER — DIPHENHYDRAMINE HCL 25 MG
25 CAPSULE ORAL
Status: DISCONTINUED | OUTPATIENT
Start: 2018-12-22 | End: 2018-12-24 | Stop reason: HOSPADM

## 2018-12-22 RX ORDER — SODIUM CHLORIDE 0.9 % (FLUSH) 0.9 %
5-10 SYRINGE (ML) INJECTION AS NEEDED
Status: DISCONTINUED | OUTPATIENT
Start: 2018-12-22 | End: 2018-12-24 | Stop reason: HOSPADM

## 2018-12-22 RX ORDER — LORAZEPAM 2 MG/ML
1 INJECTION INTRAMUSCULAR
Status: COMPLETED | OUTPATIENT
Start: 2018-12-22 | End: 2018-12-22

## 2018-12-22 RX ORDER — CYCLOBENZAPRINE HCL 10 MG
10 TABLET ORAL
Status: DISCONTINUED | OUTPATIENT
Start: 2018-12-22 | End: 2018-12-24 | Stop reason: HOSPADM

## 2018-12-22 RX ORDER — DOCUSATE SODIUM 100 MG/1
100 CAPSULE, LIQUID FILLED ORAL 2 TIMES DAILY
Status: DISCONTINUED | OUTPATIENT
Start: 2018-12-22 | End: 2018-12-24 | Stop reason: HOSPADM

## 2018-12-22 RX ORDER — ASPIRIN 325 MG
325 TABLET, DELAYED RELEASE (ENTERIC COATED) ORAL EVERY EVENING
COMMUNITY
Start: 2018-12-17 | End: 2018-12-24

## 2018-12-22 RX ORDER — DOCUSATE SODIUM 100 MG/1
100 CAPSULE, LIQUID FILLED ORAL
COMMUNITY
End: 2019-01-08 | Stop reason: ALTCHOICE

## 2018-12-22 RX ORDER — ONDANSETRON 2 MG/ML
4 INJECTION INTRAMUSCULAR; INTRAVENOUS
Status: DISCONTINUED | OUTPATIENT
Start: 2018-12-22 | End: 2018-12-24 | Stop reason: HOSPADM

## 2018-12-22 RX ORDER — ENOXAPARIN SODIUM 150 MG/ML
150 INJECTION SUBCUTANEOUS EVERY 12 HOURS
Status: DISCONTINUED | OUTPATIENT
Start: 2018-12-22 | End: 2018-12-24 | Stop reason: HOSPADM

## 2018-12-22 RX ADMIN — ENOXAPARIN SODIUM 150 MG: 150 INJECTION SUBCUTANEOUS at 21:39

## 2018-12-22 RX ADMIN — Medication 10 ML: at 21:40

## 2018-12-22 RX ADMIN — ENOXAPARIN SODIUM 150 MG: 150 INJECTION SUBCUTANEOUS at 09:54

## 2018-12-22 RX ADMIN — FENTANYL CITRATE 50 MCG: 50 INJECTION, SOLUTION INTRAMUSCULAR; INTRAVENOUS at 07:57

## 2018-12-22 RX ADMIN — LORAZEPAM 1 MG: 2 INJECTION INTRAMUSCULAR; INTRAVENOUS at 08:56

## 2018-12-22 RX ADMIN — IOPAMIDOL 100 ML: 755 INJECTION, SOLUTION INTRAVENOUS at 08:00

## 2018-12-22 RX ADMIN — ACETAMINOPHEN 650 MG: 325 TABLET, FILM COATED ORAL at 11:07

## 2018-12-22 NOTE — ED PROVIDER NOTES
46 y.o. male with past medical history significant for morbid obesity, arthritis, s/p cholecystectomy, who presents to the ED via EMS, with chief complaint of breathing problem and chest pain. EMS personnel reports that the patient woke up this morning to use the restroom when he suddenly experienced shortness of breath and \"crushing\" chest pain. They state that they found the patient on the floor in his home, and noted that the patient's initial O2 sats were \"90\". EMS report that the patient was placed on CPAP and O2 sats raised to \"96\" by time of arrival to the ED. They also report that the patient had visible edema of the bilateral feet and patient was given  mg en route. Pt reports that he believes he experienced a syncopal episode this morning. He states that he remembers feeling fine while using the restroom ~0700 this morning, but reports that he later woke up on the floor, complaining of shortness of breath and chest pain, with no memory of the events in between. Pt currently complains of shortness of breath, which is less severe than at onset, and middle chest pain which does not radiate. He describes as \"pushing from the inside\" in quality and rates his current pain as 10/10 in intensity. Notes that the pain is exacerbated with deep inhalation. Denies having any symptoms when he went to bed last night. Pt reports that he takes  mg daily, and has been taking Oxycodone for pain s/p recent right shoulder and neck surgeries. He notes that he had arthroscopic right shoulder surgery on Monday (12/17/18). Denies any h/o heart or lung issues, or blood clots. Pt specifically denies fever or cough. There are no other acute medical concerns at this time. Social hx: Tobacco use (former smoker, quit date 6/17/18, 1 pack/day for 15 years); Positive for EtOH use (0.5 oz/week);  Negative for Illicit Drug use  PCP: Aron Lopez MD    Note written by Gordy Chapin, as dictated by Moe Angel MD 7:33 AM      The history is provided by the patient, the EMS personnel and medical records. No  was used. Past Medical History:   Diagnosis Date    Arthritis     Chronic pain     lower back pain    Morbid obesity with BMI of 45.0-49.9, adult ([facility] [address] 2018    BMI= 45.9       Past Surgical History:   Procedure Laterality Date    HX HERNIA REPAIR      umbilical    HX HERNIA REPAIR  2016    incisional  (above umbilicar repair)    HX KNEE ARTHROSCOPY Right 1996    HX OPEN CHOLECYSTECTOMY  2014    HX OTHER SURGICAL Left     inguinal hernia repair    HX SHOULDER ARTHROSCOPY Left 2010         Family History:   Problem Relation Age of Onset    Hypertension Mother     Diabetes Mother    [facility] Arrhythmia Mother         pacemaker    No Known Problems Father     Hypertension Sister     Diabetes Sister     Hypertension Brother     No Known Problems Brother     No Known Problems Brother        Social History     Socioeconomic History    Marital status:      Spouse name: Not on file    Number of children: Not on file    Years of education: Not on file    Highest education level: Not on file   Social Needs    Financial resource strain: Not on file    Food insecurity - worry: Not on file    Food insecurity - inability: Not on file   [employer] needs - medical: Not on file   [employer] needs - non-medical: Not on file   Occupational History    Not on file   Tobacco Use    Smoking status: Former Smoker     Packs/day: 1.00     Years: 15.00     Pack years: 15.00     Last attempt to quit: 2018     Years since quittin.5    Smokeless tobacco: Never Used   Substance and Sexual Activity    Alcohol use:  Yes     Alcohol/week: 0.5 oz     Types: 1 Cans of beer per week     Comment: rare    Drug use: No    Sexual activity: Not on file   Other Topics Concern    Not on file   Social History Narrative    Not on file         ALLERGIES: Patient has no known allergies. Review of Systems   Constitutional: Negative for chills and fever. HENT: Negative for sore throat. Eyes: Negative for pain. Respiratory: Positive for shortness of breath. Negative for cough. Cardiovascular: Positive for chest pain and leg swelling (bilateral feet). Gastrointestinal: Negative for abdominal pain and vomiting. Genitourinary: Negative for dysuria. Skin: Negative for rash. Neurological: Positive for syncope. Negative for headaches. Psychiatric/Behavioral: Negative for confusion. All other systems reviewed and are negative. Vitals:    12/22/18 0738 12/22/18 0745 12/22/18 0746   BP:  138/81    Pulse: (!) 104 97 (!) 101   Resp: 25 16 23   Temp:  97.5 °F (36.4 °C)    SpO2:  (!) 88% 99%   Weight:  149.7 kg (330 lb)    Height:  5' 10\" (1.778 m)             Physical Exam   Constitutional: He is oriented to person, place, and time. He appears well-developed. No distress. Obese   HENT:   Head: Normocephalic and atraumatic. Eyes: Conjunctivae and EOM are normal.   Neck: Normal range of motion. Neck supple. Cardiovascular: Normal rate, regular rhythm and normal heart sounds. Pulmonary/Chest: Breath sounds normal. Tachypnea noted. No respiratory distress. Abdominal: Soft. There is no tenderness. There is no guarding. Musculoskeletal: He exhibits edema. 2+ edema of bilateral lower extremities. Right arm and shoulder immobilized. Neurological: He is alert and oriented to person, place, and time. He exhibits normal muscle tone. Skin: Skin is warm and dry. Well healed post op incisions on right shoulder. Clean, dry and intact. Vitals reviewed.   Note written by Gordy Nation, as dictated by Yael Carver MD 7:33 AM    MDM  Number of Diagnoses or Management Options  Hypoxia: new and requires workup  Other acute pulmonary embolism without acute cor pulmonale ([address]): new and requires workup  Pulmonary nodules: new and requires workup  Risk of Complications, Morbidity, and/or Mortality  Presenting problems: high  Diagnostic procedures: high  Management options: high    Critical Care  Total time providing critical care: 30-74 minutes (Total critical care time spend exclusive of procedures:  40 minutes.  )         Procedures    ED EKG interpretation:  Time: 07:41  Rhythm: sinus tachycardia; Rate (approx.): 103 bpm; Incomplete RBBB; RBBB pattern appears new when compared with 7/3/18 EKG; No STEMI;  Note written by Gordy Santiago, as dictated by Darlene Zapata MD 7:48 AM     A/P: 47 yo M with recent shoulder arthroscopy who was BIBA after a possible syncopal episode this morning upon awakening, CP, SOB, and hypoxia. Constellation of symptoms most concerning for a PE. No ischemia on the EKG, but will need troponin to screen for AMI. Also could be new onset CHF. No wheezing on ausculation and no reported PMH of asthma or COPD. CXR to r/o PNA and screen for PNA. Will likely require admission. 9:27 AM  I spoke with Dr. Checo Tatum, the hospitalist on-call. We discussed the case briefly and reason for admission. He accepts the pt for admission.

## 2018-12-22 NOTE — H&P
[facility]    Hospitalist Admission Note      NAME:  Pedro Gomez. :   1965   MRN:  [id number]     PCP:  Freddy Ingram MD     Date/Time:  2018 9:32 AM          Subjective:     CHIEF COMPLAINT: CP     HISTORY OF PRESENT ILLNESS:     Mr. Sheree Selby is a 46 y.o.  male who presented to the Emergency Department complaining of CP. Occurred suddenly this AM.  He had syncope, passed out, witnessed by his wife. Recovered quickly. Associated with dyspnea and hypoxia. New. ER workup finds PE. Had spinal surgery in August and shoulder surgery 5 days ago [facility] and was on ASA as prophylaxis, due to debility. We will admit him for management. Past Medical History:   Diagnosis Date    Arthritis     Chronic pain     lower back pain    Morbid obesity with BMI of 45.0-49.9, adult ([facility] [address] 2018    BMI= 45.9        Past Surgical History:   Procedure Laterality Date    HX HERNIA REPAIR      umbilical    HX HERNIA REPAIR  2016    incisional  (above umbilicar repair)    HX KNEE ARTHROSCOPY Right     HX OPEN CHOLECYSTECTOMY      HX OTHER SURGICAL Left     inguinal hernia repair    HX SHOULDER ARTHROSCOPY Left        Social History     Tobacco Use    Smoking status: Former Smoker     Packs/day: 1.00     Years: 15.00     Pack years: 15.00     Last attempt to quit: 2018     Years since quittin.5    Smokeless tobacco: Never Used   Substance Use Topics    Alcohol use:  Yes     Alcohol/week: 0.5 oz     Types: 1 Cans of beer per week     Comment: rare        Family History   Problem Relation Age of Onset    Hypertension Mother     Diabetes Mother     Arrhythmia Mother         pacemaker    No Known Problems Father     Hypertension Sister     Diabetes Sister     Hypertension Brother     No Known Problems Brother     No Known Problems Brother      No Known Allergies     Prior to Admission medications    Medication Sig Start Date End Date Taking? Authorizing Provider   aspirin delayed-release 325 mg tablet Take 325 mg by mouth. 12/17/18  Yes Provider, Historical   gabapentin (NEURONTIN) 300 mg capsule Take 300 mg by mouth. 3/26/18 3/26/19 Yes Provider, Historical   oxyCODONE-acetaminophen (PERCOCET) 5-325 mg per tablet Take 1-2 Tabs by mouth every six (6) hours as needed for Pain. Max Daily Amount: 8 Tabs. 8/7/18  Yes Aleisha Cook NP   promethazine (PHENERGAN) 25 mg tablet Take 1 Tab by mouth every six (6) hours as needed for Nausea. 8/7/18  Yes Aleisha Cook NP   docusate sodium (COLACE) 100 mg capsule Take 1 Cap by mouth two (2) times a day. 8/7/18   Aleisha Cook NP   cyclobenzaprine (FLEXERIL) 10 mg tablet Take 1 Tab by mouth three (3) times daily as needed for Muscle Spasm(s).  8/7/18   Aleisha Cook NP       Review of Systems:  (bold if positive, if negative)    Gen:  Eyes:  ENT:  CVS:  Pulm:  GI:    :    MS:  Pain, weakness, swelling, arthritisSkin:  Psych:  Endo:    Hem:  Renal:    Neuro:        Objective:      VITALS:    Vital signs reviewed; most recent are:    Visit Vitals  /82 (BP 1 Location: Left arm, BP Patient Position: At rest)   Pulse (!) 118   Temp 97.5 °F (36.4 °C)   Resp 21   Ht 5' 10\" (1.778 m)   Wt 149.7 kg (330 lb)   SpO2 99%   BMI 47.35 kg/m²     SpO2 Readings from Last 6 Encounters:   12/22/18 99%   08/07/18 97%   08/01/18 100%   07/03/18 99%   01/15/18 98%   11/11/13 99%    O2 Flow Rate (L/min): 15 l/min   No intake or output data in the 24 hours ending 12/22/18 1016     Exam:     Physical Exam:    Gen:  Morbid obese, in no acute distress  HEENT:  Pink conjunctivae, PERRL, hearing intact to voice, moist mucous membranes  Neck:  Supple, without masses, thyroid non-tender  Resp:  No accessory muscle use, distant breath sounds without wheezes rales or rhonchi  Card:  No murmurs, tachycardic S1, S2 without thrills, bruits or peripheral edema  Abd:  Soft, non-tender, non-distended, normoactive bowel sounds are present, no mass  Lymph:  No cervical or inguinal adenopathy  Musc:  No cyanosis or clubbing  Skin:  No rashes or ulcers, skin turgor is good  Neuro:  Cranial nerves are grossly intact, no focal motor weakness, follows commands appropriately  Psych:  Good insight, oriented to person, place and time, alert     Labs:    Recent Labs     12/22/18  0755   WBC 11.5*   HGB 13.8   HCT 42.4        Recent Labs     12/22/18  0755      K 4.1      CO2 26   *   BUN 16   CREA 1.28   CA 8.6   MG 1.9   ALB 3.4*   TBILI 0.5   SGOT 34   ALT 39     No results found for: GLUCPOC  Recent Labs     12/22/18  0745   PH 7.35   PCO2 41   PO2 133*   HCO3 22   FIO2 100     Recent Labs     12/22/18  0755   INR 1.0     All Micro Results     Procedure Component Value Units Date/Time    CULTURE, BLOOD, PERIPHERAL [[id number]] Collected:  12/22/18 0755    Order Status:  Completed Specimen:  Blood Updated:  12/22/18 1011    CULTURE, URINE [[id number]] Collected:  12/22/18 0957    Order Status:  Completed Specimen:  Urine from Clean catch Updated:  12/22/18 1007    CULTURE, BLOOD, PAIRED [[id number]]     Order Status:  Canceled Specimen:  Blood           I have reviewed previous records       Assessment and Plan:      Pulmonary emboli - POA, new and likely triggered by immobility following shoulder surgery 5 days ago. No personal or family hx of DVT/PE. Check dopplers for clot burden. Lovenox for now. Chest pain  / Tachycardia - POA, new. No Hx of CAD. Trend troponin, but likely strain. Consult cards if bumps up. Check ECHO for strain. Hold ASA while on lovenox. Acute hypoxia - POA, presumed due to PE. Oxygen by NC as needed. Check 6 minute walk prior to DC    Cervical stenosis of spinal canal / Chronic pain / Arthritis - Consult his surgeons regarding this post operative complication.   Tylenol, flexeril, gabpentin or percocet prn    Morbid obesity with BMI of 45.0-49.9 - Advise weight loss. Likely has DREW hypoventilation syndrome. Consult Pulm. Needs outpatient testing after PE resolved.      Telemetry reviewed:   normal sinus rhythm    Risk of deterioration: high      Total time spent with patient: [address] discussed with: Patient, Family, Care Manager, Nursing Staff, Consultant/Specialist and >50% of time spent in counseling and coordination of care    Discussed:  Care Plan       ___________________________________________________    Attending Physician: Akhil Wesley MD

## 2018-12-22 NOTE — CONSULTS
ORTHO CONSULT    Subjective:     Date of Consultation:  2018    Referring Physician:  Dr. Rivera. is a 46 y.o. male we are consulted to see for post op evaluation of R shoulder s/p R RCR with arthroscopy and decompression. Pt. Is POD 6, Dr. Anne Greenberg is his surgeon. Went home on Aspirin and returned today with syncopal episode, fall on R shoulder, CP and SOB. Pt. Is found to have a PE. Denies other injuries. Denies numbness or tingling in R shoulder. Patient Active Problem List    Diagnosis Date Noted    Pulmonary emboli ([facility] 2018    Chronic pain     Arthritis     Cervical stenosis of spinal canal 2018    Morbid obesity with BMI of 45.0-49.9, adult ([facility] 2018     Family History   Problem Relation Age of Onset    Hypertension Mother     Diabetes Mother     Arrhythmia Mother         pacemaker    No Known Problems Father     Hypertension Sister     Diabetes Sister     Hypertension Brother     No Known Problems Brother     No Known Problems Brother       Social History     Tobacco Use    Smoking status: Former Smoker     Packs/day: 1.00     Years: 15.00     Pack years: 15.00     Last attempt to quit: 2018     Years since quittin.5    Smokeless tobacco: Never Used   Substance Use Topics    Alcohol use:  Yes     Alcohol/week: 0.5 oz     Types: 1 Cans of beer per week     Comment: rare     Past Medical History:   Diagnosis Date    Arthritis     Chronic pain     lower back pain    Morbid obesity with BMI of 45.0-49.9, adult ([facility] 2018    BMI= 45.9      Past Surgical History:   Procedure Laterality Date    HX HERNIA REPAIR      umbilical    HX HERNIA REPAIR  2016    incisional  (above umbilicar repair)    HX KNEE ARTHROSCOPY Right     HX OPEN CHOLECYSTECTOMY      HX OTHER SURGICAL Left     inguinal hernia repair    HX SHOULDER ARTHROSCOPY Left       Prior to Admission medications    Medication Sig Start Date End Date Taking? Authorizing Provider   aspirin delayed-release 325 mg tablet Take 325 mg by mouth every evening. 12/17/18  Yes Provider, Historical   docusate sodium (COLACE) 100 mg capsule Take 100 mg by mouth two (2) times daily as needed for Constipation. Yes Provider, Historical   oxyCODONE-acetaminophen (PERCOCET) 5-325 mg per tablet Take 1-2 Tabs by mouth every six (6) hours as needed for Pain. Max Daily Amount: 8 Tabs. 8/7/18  Yes Qi Xie NP   promethazine (PHENERGAN) 25 mg tablet Take 1 Tab by mouth every six (6) hours as needed for Nausea. 8/7/18  Yes Qi Xie NP     Current Facility-Administered Medications   Medication Dose Route Frequency    cyclobenzaprine (FLEXERIL) tablet 10 mg  10 mg Oral TID PRN    docusate sodium (COLACE) capsule 100 mg  100 mg Oral BID    oxyCODONE-acetaminophen (PERCOCET) 5-325 mg per tablet 1 Tab  1 Tab Oral Q6H PRN    sodium chloride (NS) flush 5-10 mL  5-10 mL IntraVENous Q8H    sodium chloride (NS) flush 5-10 mL  5-10 mL IntraVENous PRN    naloxone (NARCAN) injection 0.4 mg  0.4 mg IntraVENous PRN    acetaminophen (TYLENOL) tablet 650 mg  650 mg Oral Q4H PRN    diphenhydrAMINE (BENADRYL) capsule 25 mg  25 mg Oral Q4H PRN    ondansetron (ZOFRAN) injection 4 mg  4 mg IntraVENous Q4H PRN    enoxaparin (LOVENOX) injection 150 mg  150 mg SubCUTAneous Q12H     Current Outpatient Medications   Medication Sig    aspirin delayed-release 325 mg tablet Take 325 mg by mouth every evening.  docusate sodium (COLACE) 100 mg capsule Take 100 mg by mouth two (2) times daily as needed for Constipation.  oxyCODONE-acetaminophen (PERCOCET) 5-325 mg per tablet Take 1-2 Tabs by mouth every six (6) hours as needed for Pain. Max Daily Amount: 8 Tabs.  promethazine (PHENERGAN) 25 mg tablet Take 1 Tab by mouth every six (6) hours as needed for Nausea.      No Known Allergies     Review of Systems:  A comprehensive review of systems was negative except for that written in the HPI. Objective:     Visit Vitals  /84 (BP 1 Location: Left arm, BP Patient Position: At rest)   Pulse (!) 107   Temp 98.1 °F (36.7 °C)   Resp 17   Ht 5' 10\" (1.778 m)   Wt 149.7 kg (330 lb)   SpO2 97%   BMI 47.35 kg/m²       EXAM: I examined pt's R shoulder, Portal bandaids intact, well healing wounds without s/sx infection. No drainage. Moderate effusion, Motor/sensory intact distally all fingers. Radial pulses = BUE's. Gentle ROM of the R shoulder with mild discomfort. XR Results (most recent):          Assessment/Plan:     Encounter Diagnoses     ICD-10-CM ICD-9-CM   1. Other acute pulmonary embolism without acute cor pulmonale ([facility]) I26.99 415.19   2. Pulmonary nodules R91.8 793.19   3. Hypoxia R09.02 799.02   4. Morbid obesity with BMI of 45.0-49.9, adult ([facility]) E66.01 278.01    Z68.42 V85.42   5. Chronic pain syndrome G89.4 338.4     XR R shoulder pending to eval if internal derangement from fall. Appreciate [facility] treatment of PE. Will re-eval pt. After xrays return. Dr. Leticia Chan agrees with plan. Thank you for allowing us to take part in this patients care.         Earnest Choe PA-C    Orthopaedic Surgery PA

## 2018-12-22 NOTE — ED NOTES
Pt returned from CT. Dr. Andreas Vargas at bedside to re-evaluate and explain results. Good understanding verbalized. Pt is in agreement with plan for of care including admission.

## 2018-12-22 NOTE — PROGRESS NOTES
[facility]: MED RECONCILIATION    Medications added:     · None    Medications removed:    · Flexeril  · Gabapentin    Medications adjusted:    · None    Allergies: Patient has no known allergies. Prior to Admission Medications:     Prior to Admission Medications   Prescriptions Last Dose Informant Patient Reported? Taking?   aspirin delayed-release 325 mg tablet 12/21/2018 at Unknown time Self Yes Yes   Sig: Take 325 mg by mouth every evening. docusate sodium (COLACE) 100 mg capsule 12/21/2018 at Unknown time Self Yes Yes   Sig: Take 100 mg by mouth two (2) times daily as needed for Constipation. oxyCODONE-acetaminophen (PERCOCET) 5-325 mg per tablet 12/21/2018 at Unknown time Self No Yes   Sig: Take 1-2 Tabs by mouth every six (6) hours as needed for Pain. Max Daily Amount: 8 Tabs. promethazine (PHENERGAN) 25 mg tablet 12/21/2018 at Unknown time Self No Yes   Sig: Take 1 Tab by mouth every six (6) hours as needed for Nausea.       Facility-Administered Medications: None      Thank you,    Greig Holstein, KobeD, BCPS

## 2018-12-22 NOTE — ED NOTES
Consulted respiratory therapist regarding oxygen therapy. Pt remains on NRB but pt c/o that mask is making him feel claustrophobic. RT recommended starting pt on 6 L of O2 NC. Pt placed on NC 6 L. Will continue to monitor.

## 2018-12-22 NOTE — CONSULTS
Jonathani  14.DEN Sagastume  MR#: [id number]  : 1965  ACCOUNT #: [de-identified]   DATE OF SERVICE: 2018    REASON FOR CONSULTATION:  We were kindly asked by Dr. Rj Graves to see   the patient in regards to pulmonary emboli as well as question of obstructive sleep apnea. He is a 69-year-old male who was admitted yesterday after a witnessed syncope. He had some shortness of breath and was noted to have decreased oxygen saturations. He has had recent spinal surgery as well as shoulder surgery. He has been taking aspirin. He had a spiral CT scan performed which demonstrated evidence of bilateral pulmonary emboli. He also reports that he has calf tenderness bilaterally. PAST MEDICAL HISTORY:  Notable for he had his recent surgeries as mentioned above. He has had some arthritis and chronic low back pain. He has a history of obesity. PAST SURGICAL HISTORY:  Left shoulder surgery in  without any issues. SOCIAL HISTORY:  Lives at home with his wife. He rarely consumes alcohol. FAMILY HISTORY:  Remarkable for diabetes and hypertension. REVIEW OF SYSTEMS:  Ten-system comprehensive review of systems form was marked with no problems with his eyes, ears, nose or throat. RESPIRATORY:  As per HPI. CARDIOVASCULAR:  He denies any anginal, chest pain, palpitations or arrhythmia. He denies any GI or  issues. MUSCULOSKELETAL:  He has swelling of his legs and discomfort at his surgical sites. SKIN:  He has no rash. LYMPHATIC:  No adenopathy. PHYSICAL EXAMINATION:  GENERAL:  He is a well-nourished, well-developed male who is breathing comfortably. VITAL SIGNS:  Temperature is 98.1, pulse is 107 degrees, blood pressure is 130/84, saturations are 97%. HEENT:  He is anicteric. Oropharynx is crowded. He has a type 4 airway. NECK:  Circumference is enlarged. There is no adenopathy.   LUNGS:  He has good air movement bilaterally. No rales, rhonchi or wheezes. Right shoulder has a surgical change. HEART:  Regular rate and rhythm without rubs, murmurs or gallop. ABDOMEN:  Obese, soft, nontender, active bowel sounds, no hepatosplenomegaly. EXTREMITIES:  Demonstrate taut lower extremities at the calves, has no palpable cords. SKIN:  Warm and dry. NEUROLOGIC:  He is grossly intact. MUSCULOSKELETAL:  He has good tone. GENITOURINARY AND RECTAL:  Deferred. LABORATORY DATA:  CT scan of the chest was reviewed personally by me on the [facility] system. He has evidence of bilateral multiple pulmonary emboli. CBC:  WBC is 11.5, hemoglobin of 13.8 234,000 platelets. Arterial blood gas demonstrated pO2 of 133, pCO2 of 41, pH of 7.35, 100% nonrebreather. Chemistry:  Sodium 137, K 4.1, chloride 102, CO2 26, glucose 215, BUN 16, creatinine 1.28. IMPRESSION:  The patient had a syncopal episode related to multiple bilateral pulmonary emboli. He is on appropriate therapy. He should clinically improve over the next 5-7 days. He also most likely has underlying obstructive sleep apnea. He does snore and has a crowded airway. I reviewed with him the risk of untreated sleep apnea. He was agreeable to proceed with outpatient sleep testing once he heals from his current surgical.    Thank you for asking us to see the patient in consultation. We will be glad to assist in his management if you feel clinically indicated.       Liane Nageotte, MD       DWP / HN  D: 12/22/2018 12:24     T: 12/22/2018 14:02  JOB #: [id number]  CC: Sheryle Fetters MD  CC: Jazz Vásquez MD

## 2018-12-22 NOTE — PROGRESS NOTES
1215 - Bedside and Verbal shift change report given to Abner Rubio RN (oncoming nurse) by Stacie Rios (offgoing nurse). Report included the following information SBAR, Kardex, ED Summary, Recent Results and Cardiac Rhythm NSR. SpO2 94% on 4L NC, pt denies any complaints at this time.

## 2018-12-22 NOTE — ED NOTES
Pt resting on stretcher in no distress speaking with family. Pt's HR elevated. Dr. Douglas Stallworth notified. No orders received.

## 2018-12-22 NOTE — ED TRIAGE NOTES
Pt arrives via [facility] for sudden onset of difficulty breathing and chest pain. Pt arrives on CPAP. Pt's sats in mid to upper 80s on RA. Pt placed on NRB.

## 2018-12-23 PROBLEM — R55 SYNCOPE AND COLLAPSE: Status: ACTIVE | Noted: 2018-12-23

## 2018-12-23 LAB
ATRIAL RATE: 103 BPM
CALCULATED P AXIS, ECG09: 57 DEGREES
CALCULATED R AXIS, ECG10: 76 DEGREES
CALCULATED T AXIS, ECG11: 49 DEGREES
DIAGNOSIS, 93000: NORMAL
P-R INTERVAL, ECG05: 142 MS
Q-T INTERVAL, ECG07: 366 MS
QRS DURATION, ECG06: 102 MS
QTC CALCULATION (BEZET), ECG08: 479 MS
VENTRICULAR RATE, ECG03: 103 BPM

## 2018-12-23 PROCEDURE — 93970 EXTREMITY STUDY: CPT

## 2018-12-23 PROCEDURE — 74011250636 HC RX REV CODE- 250/636: Performed by: INTERNAL MEDICINE

## 2018-12-23 PROCEDURE — 65660000000 HC RM CCU STEPDOWN

## 2018-12-23 PROCEDURE — 93306 TTE W/DOPPLER COMPLETE: CPT

## 2018-12-23 PROCEDURE — 93971 EXTREMITY STUDY: CPT

## 2018-12-23 PROCEDURE — 74011250637 HC RX REV CODE- 250/637: Performed by: INTERNAL MEDICINE

## 2018-12-23 RX ADMIN — DOCUSATE SODIUM 100 MG: 100 CAPSULE, LIQUID FILLED ORAL at 09:09

## 2018-12-23 RX ADMIN — Medication 10 ML: at 04:03

## 2018-12-23 RX ADMIN — ENOXAPARIN SODIUM 150 MG: 150 INJECTION SUBCUTANEOUS at 09:09

## 2018-12-23 RX ADMIN — Medication 10 ML: at 14:21

## 2018-12-23 RX ADMIN — Medication 10 ML: at 21:37

## 2018-12-23 RX ADMIN — ENOXAPARIN SODIUM 150 MG: 150 INJECTION SUBCUTANEOUS at 21:37

## 2018-12-23 NOTE — PROGRESS NOTES
Bedside and Verbal shift change report given to Hoang Pizano (oncoming nurse) by Ross Mcmahon (offgoing nurse). Report included the following information SBAR, Kardex, Intake/Output, Accordion and Recent Results. 1600: pt up in chair. No complaints of pain at this time. Reports having a BM \"about a hour ago\"    Bedside and Verbal shift change report given to Ross Mcmahon (oncoming nurse) by Hoang Pizano (offgoing nurse). Report included the following information SBAR, Kardex, Intake/Output, Accordion and Recent Results.

## 2018-12-23 NOTE — PROGRESS NOTES
[facility]  Preliminary Report:  Venous Duplex Leg    Bilateral leg venous duplex was performed. All deep and superficial veins appear compressible with normal Doppler characteristics. Final report to follow.

## 2018-12-23 NOTE — PROGRESS NOTES
1900 TRANSFER - OUT REPORT:    Verbal report given to Le YEN (name) on Venancio Beasley.  being transferred to [facility]  (unit) for routine progression of care       Report consisted of patients Situation, Background, Assessment and   Recommendations(SBAR). Information from the following report(s) SBAR, Kardex, ED Summary, Recent Results and Cardiac Rhythm ST was reviewed with the receiving nurse. Lines:   Peripheral IV 12/22/18 Right Antecubital (Active)   Site Assessment Clean, dry, & intact 12/22/2018 12:17 PM   Phlebitis Assessment 0 12/22/2018 12:17 PM   Infiltration Assessment 0 12/22/2018 12:17 PM   Dressing Status Clean, dry, & intact 12/22/2018 12:17 PM   Dressing Type Transparent;Tape 12/22/2018 12:17 PM   Hub Color/Line Status Pink;Capped 12/22/2018 12:17 PM        Opportunity for questions and clarification was provided. Patient transported with:   Monitor  O2 @ 4 liters  Registered Nurse      Pt to be transferred to room at [address] [place] [address] after change of shift window.

## 2018-12-23 NOTE — PROGRESS NOTES
1900- TRANSFER - IN REPORT:    Verbal report received from Johnnie Carroll RN(name) on [place].  being received from ED(unit) for routine progression of care      Report consisted of patients Situation, Background, Assessment and   Recommendations(SBAR). Information from the following report(s) SBAR, Kardex and Recent Results was reviewed with the receiving nurse. Opportunity for questions and clarification was provided. Assessment completed upon patients arrival to unit and care assumed. .. 1930- Bedside and Verbal shift change report given to LIZ VILLA RN  (oncoming nurse) by Kathy Rebolledo RN  (offgoing nurse). Report included the following information SBAR, Kardex and Recent Results. ..

## 2018-12-23 NOTE — CONSULTS
ORTHO CONSULT    Subjective:     Date of Consultation:  2018    Referring Physician:  Dr. Radha Multani. is a 46 y.o. RHD male POD #6 status post R RCR with arthroscopy and decompression by Dr. Mary Joseph and asked to see in consultation for evalutaion of his shoulder Went home on Aspirin and returned today with syncopal episode, fall on R shoulder, CP and SOB. Pt was found to have a PE admitted to medicine and started on anticoagulation. Denies other injuries. Denies numbness or tingling in R shoulder. Pain in shoulder controlled. Started PT on Thursday and been doing well. Had syncopal event on toilet afetr getting up quickly. SOB has improved overnight. Off oxygen this AM. Denies CP. Did have Shashi with elevated trop. Cardiology consulted. Dopplers pending. Patient Active Problem List    Diagnosis Date Noted    Pulmonary emboli ([facility] 2018    Chronic pain     Arthritis     Cervical stenosis of spinal canal 2018    Morbid obesity with BMI of 45.0-49.9, adult ([facility] 2018     Family History   Problem Relation Age of Onset    Hypertension Mother     Diabetes Mother     Arrhythmia Mother         pacemaker    No Known Problems Father     Hypertension Sister     Diabetes Sister     Hypertension Brother     No Known Problems Brother     No Known Problems Brother       Social History     Tobacco Use    Smoking status: Former Smoker     Packs/day: 1.00     Years: 15.00     Pack years: 15.00     Last attempt to quit: 2018     Years since quittin.5    Smokeless tobacco: Never Used   Substance Use Topics    Alcohol use:  Yes     Alcohol/week: 0.5 oz     Types: 1 Cans of beer per week     Comment: rare     Past Medical History:   Diagnosis Date    Arthritis     Chronic pain     lower back pain    Morbid obesity with BMI of 45.0-49.9, adult ([facility] 2018    BMI= 45.9      Past Surgical History:   Procedure Laterality Date    HX HERNIA REPAIR  2014 umbilical    HX HERNIA REPAIR  2016    incisional  (above umbilicar repair)    HX KNEE ARTHROSCOPY Right 1996    HX OPEN CHOLECYSTECTOMY  2014    HX OTHER SURGICAL Left     inguinal hernia repair    HX SHOULDER ARTHROSCOPY Left 2010      Prior to Admission medications    Medication Sig Start Date End Date Taking? Authorizing Provider   aspirin delayed-release 325 mg tablet Take 325 mg by mouth every evening. 12/17/18  Yes Provider, Historical   docusate sodium (COLACE) 100 mg capsule Take 100 mg by mouth two (2) times daily as needed for Constipation. Yes Provider, Historical   oxyCODONE-acetaminophen (PERCOCET) 5-325 mg per tablet Take 1-2 Tabs by mouth every six (6) hours as needed for Pain. Max Daily Amount: 8 Tabs. 8/7/18  Yes Kathy Julian NP   promethazine (PHENERGAN) 25 mg tablet Take 1 Tab by mouth every six (6) hours as needed for Nausea. 8/7/18  Yes Kathy Julian NP     Current Facility-Administered Medications   Medication Dose Route Frequency    cyclobenzaprine (FLEXERIL) tablet 10 mg  10 mg Oral TID PRN    docusate sodium (COLACE) capsule 100 mg  100 mg Oral BID    oxyCODONE-acetaminophen (PERCOCET) 5-325 mg per tablet 1 Tab  1 Tab Oral Q6H PRN    sodium chloride (NS) flush 5-10 mL  5-10 mL IntraVENous Q8H    sodium chloride (NS) flush 5-10 mL  5-10 mL IntraVENous PRN    naloxone (NARCAN) injection 0.4 mg  0.4 mg IntraVENous PRN    acetaminophen (TYLENOL) tablet 650 mg  650 mg Oral Q4H PRN    diphenhydrAMINE (BENADRYL) capsule 25 mg  25 mg Oral Q4H PRN    ondansetron (ZOFRAN) injection 4 mg  4 mg IntraVENous Q4H PRN    enoxaparin (LOVENOX) injection 150 mg  150 mg SubCUTAneous Q12H     No Known Allergies     Review of Systems:  A comprehensive review of systems was negative except for that written in the HPI.     Objective:     Visit Vitals  /75 (BP 1 Location: Left arm, BP Patient Position: At rest)   Pulse 91   Temp 98.8 °F (37.1 °C)   Resp 18   Ht 5' 10\" (1.778 m)   Wt 147 kg (324 lb 1.2 oz)   SpO2 98%   BMI 46.50 kg/m²       EXAM:  GEN: NAD, A/Ox3  HEENT:NCAT  RESP: Nonlaborred breathing on RA  Cards: RRR  RUE: Portal hole bandaids intact, well healing wounds without s/sx infection. No drainage. Moderate effusion, Motor/sensory intact distally all fingers. Radial pulses = BUE's. Gentle ROM of the R shoulder with mild discomfort. BLE: swollen but no focal pain or bands appreciated. 5/5 DF/PF ankles, EHL/FHL PT 2+    XR Results (most recent):    XR R Shoulder no fractures or dislocations appreciated. Stable alignement and anatomy. Assessment/Plan:     Encounter Diagnoses     ICD-10-CM ICD-9-CM   1. Other acute pulmonary embolism without acute cor pulmonale ([facility]) I26.99 415.19   2. Pulmonary nodules R91.8 793.19   3. Hypoxia R09.02 799.02   4. Morbid obesity with BMI of 45.0-49.9, adult ([facility]) E66.01 278.01    Z68.42 V85.42   5. Chronic pain syndrome G89.4 338.4   admit to medicine  PE: Anticoagulation per medicine, appreciate medicine and Pulm assistance. No hx of DVT/PE in self or family. May require w/u in the future  Shashi: Cards consulted, asx at moment  R RTC repair: cont sling, hold on PT at the moment, will follow up this Friday with Dr. Efrain Rnadle. Will continue to follow. Contact with questions or concerns.       Demi Padilla MD  Orthopaedic Surgeon

## 2018-12-23 NOTE — PROGRESS NOTES
Problem: Falls - Risk of  Goal: *Absence of Falls  Document Brian Fall Risk and appropriate interventions in the flowsheet.   Outcome: Progressing Towards Goal  Fall Risk Interventions:            Medication Interventions: Patient to call before getting OOB

## 2018-12-23 NOTE — PROCEDURES
[facility]  *** FINAL REPORT ***    Name: Richie De La Cruz  MRN: [id number]    Inpatient  : 18 Oct 1965  HIS Order #: [id number]  [id number] [facility] Visit #: [id number]  Date: 23 Dec 2018    TYPE OF TEST: Peripheral Venous Testing    REASON FOR TEST  Pain in limb, Limb swelling, Suspected pulmonary embolism    Right Leg:-  Deep venous thrombosis:           No  Superficial venous thrombosis:    No  Deep venous insufficiency:        Not examined  Superficial venous insufficiency: Not examined    Left Leg:-  Deep venous thrombosis:           No  Superficial venous thrombosis:    No  Deep venous insufficiency:        Not examined  Superficial venous insufficiency: Not examined      INTERPRETATION/FINDINGS  PROCEDURE:  BILATERAL LE VENOUS DUPLEX. Evaluation of lower extremity veins with ultrasound (B-mode imaging,  pulsed Doppler, color Doppler). Includes the common femoral, deep  femoral, femoral, popliteal, posterior tibial, peroneal, and great  saphenous veins. FINDINGS:  Anise [place] scale and color flow duplex images of the veins in  both lower extremities demonstrate normal compressibility, spontaneous   and augmented flow profiles, and absence of filling defects  throughout the deep and superficial veins in both lower extremities. CONCLUSION: Bilateral lower extremity venous duplex negative for deep  venous thrombosis or thrombophlebitis. ADDITIONAL COMMENTS    I have personally reviewed the data relevant to the interpretation of  this  study. TECHNOLOGIST: Emily Santiago  Signed: 2018 09:31 AM    PHYSICIAN: Johny Pritchard.  Sinan Garza MD  Signed: 2018 03:03 PM

## 2018-12-23 NOTE — PROGRESS NOTES
[facility]  Preliminary Report:  Venous Duplex Arm    Right arm venous duplex was performed. All deep and superficial veins appear compressible with normal Doppler characteristics. Difficult exam due to patient shoulder surgery and positioning in bed. Final report to follow.

## 2018-12-23 NOTE — PROGRESS NOTES
Change of Shift Report:    1920: Bedside and Verbal shift change report given to [address] [place] [address] Avenue, RN (oncoming nurse) by Janes Mullins RN (offgoing nurse). Report included the following information SBAR, Kardex, ED Summary, MAR, Accordion, Recent Results and Cardiac Rhythm NSR. Shift Summary:    2000:  Notified by Chiquis Subramanian RN that she was notified by lab for a critical lab for a Trop of 1.86.       2010:  Dr. Ricco Collins notified regarding patient's trop of 1.86. MD stated that he would put in orders for Lovenox and for a Cardiology consult. 2054:  Patient arrived on unit. Dual skin assessment completed with ED nurse, Isela Moore. No abnormalities except for 5 incision sites on R arm. Patient's R arm in sling. Patient's vitals taken and patient assessment completed. 2139:  Patient's scheduled meds given. 2332:  Patient's vitals taken. 0000:  No AM labs ordered. 9971:  Patient's vitals taken. Patient states that dull chest pain has gone away and reporting pain of 0.      0715: Bedside and Verbal shift change report given to Kirill He RN (oncoming nurse) by [address] [place] [address] Avenue, RN (offgoing nurse). Report included the following information SBAR, Kardex, ED Summary, MAR, Accordion, Recent Results and Cardiac Rhythm NSR.

## 2018-12-23 NOTE — PROCEDURES
[facility]  *** FINAL REPORT ***    Name: Tray Arceo  MRN: [id number]    Inpatient  : 18 Oct 1965  HIS Order #: [id number]  [id number] [facility] Visit #: [id number]  Date: 23 Dec 2018    TYPE OF TEST: Peripheral Venous Testing    REASON FOR TEST  Pain in limb, Limb swelling    Right Arm:-  Deep venous thrombosis:           No  Superficial venous thrombosis:    No      INTERPRETATION/FINDINGS  PROCEDURE: RIGHT UPPER EXTREMITY VENOUS DUPLEX: Evaluation of upper  extremity veins with ultrasound (B-mode imaging, pulsed Doppler, color   Doppler). Includes the internal jugular, subclavian, axillary,  brachial, radial, ulnar, basilic, and cephalic veins. FINDINGS: Francisco Javier Robert scale and color flow duplex images of the veins of the  right upper extremity and bilateral subclavian veins demonstrate  normal compressibility, absence of filing defects, reflux or phlebitic   changes of the internal jugular, bilateral subclavian, axillary,  upper arm and forearm veins of the right arm. CONCLUSION: Normal Right upper extremity venous duplex. No deep vein  thrombosis or thrombophlebitis. No evidence of thrombus in  contralateral left subclavian vein. ADDITIONAL COMMENTS  Difficult study due to recent shoulder surgery and positioning in bed. I have personally reviewed the data relevant to the interpretation of  this  study. TECHNOLOGIST: Althea Stephens  Signed: 2018 09:24 AM    PHYSICIAN: Avel Alves.  Tyesha Doe MD  Signed: 2018 03:03 PM

## 2018-12-23 NOTE — CONSULTS
HISTORY OF PRESENTING ILLNESS      Natalie Rush is a 46 y.o. male presenting with chest pain and syncope and found to have a pulmonary embolism. He underwent shoulder surgery recently. He has remained hemodynamically stable. Cardiac enzymes were obtained and troponin was found to be 1.86. An echocardiogram has been ordered to evaluate for RV strain. He has been started on anticoagulation for his pulmonary embolism. EKG shows sinus rhythm with incomplete RBBB. Denies family history of CAD. Quit smoking 7 months ago.         ACTIVE PROBLEM LIST     Patient Active Problem List    Diagnosis Date Noted    Pulmonary emboli ([facility] 12/22/2018    Chronic pain     Arthritis     Cervical stenosis of spinal canal 08/06/2018    Morbid obesity with BMI of 45.0-49.9, adult ([facility] 08/01/2018           MEDICATIONS     Current Facility-Administered Medications   Medication Dose Route Frequency    cyclobenzaprine (FLEXERIL) tablet 10 mg  10 mg Oral TID PRN    docusate sodium (COLACE) capsule 100 mg  100 mg Oral BID    oxyCODONE-acetaminophen (PERCOCET) 5-325 mg per tablet 1 Tab  1 Tab Oral Q6H PRN    sodium chloride (NS) flush 5-10 mL  5-10 mL IntraVENous Q8H    sodium chloride (NS) flush 5-10 mL  5-10 mL IntraVENous PRN    naloxone (NARCAN) injection 0.4 mg  0.4 mg IntraVENous PRN    acetaminophen (TYLENOL) tablet 650 mg  650 mg Oral Q4H PRN    diphenhydrAMINE (BENADRYL) capsule 25 mg  25 mg Oral Q4H PRN    ondansetron (ZOFRAN) injection 4 mg  4 mg IntraVENous Q4H PRN    enoxaparin (LOVENOX) injection 150 mg  150 mg SubCUTAneous Q12H           PAST MEDICAL HISTORY     Past Medical History:   Diagnosis Date    Arthritis     Chronic pain     lower back pain    Morbid obesity with BMI of 45.0-49.9, adult ([facility] 08/01/2018    BMI= 45.9           PAST SURGICAL HISTORY     Past Surgical History:   Procedure Laterality Date    HX HERNIA REPAIR  13/3184    umbilical    HX HERNIA REPAIR  2016    incisional (above umbilicar repair)    HX KNEE ARTHROSCOPY Right 1996    HX OPEN CHOLECYSTECTOMY  2014    HX OTHER SURGICAL Left     inguinal hernia repair    HX SHOULDER ARTHROSCOPY Left 2010          ALLERGIES     No Known Allergies       FAMILY HISTORY     Family History   Problem Relation Age of Onset    Hypertension Mother     Diabetes Mother     Arrhythmia Mother         pacemaker    No Known Problems Father     Hypertension Sister     Diabetes Sister     Hypertension Brother     No Known Problems Brother     No Known Problems Brother     negative for cardiac disease       SOCIAL HISTORY     Social History     Socioeconomic History    Marital status:      Spouse name: Not on file    Number of children: Not on file    Years of education: Not on file    Highest education level: Not on file   Tobacco Use    Smoking status: Former Smoker     Packs/day: 1.00     Years: 15.00     Pack years: 15.00     Last attempt to quit: 2018     Years since quittin.5    Smokeless tobacco: Never Used   Substance and Sexual Activity    Alcohol use:  Yes     Alcohol/week: 0.5 oz     Types: 1 Cans of beer per week     Comment: rare    Drug use: No         MEDICATIONS     Current Facility-Administered Medications   Medication Dose Route Frequency    cyclobenzaprine (FLEXERIL) tablet 10 mg  10 mg Oral TID PRN    docusate sodium (COLACE) capsule 100 mg  100 mg Oral BID    oxyCODONE-acetaminophen (PERCOCET) 5-325 mg per tablet 1 Tab  1 Tab Oral Q6H PRN    sodium chloride (NS) flush 5-10 mL  5-10 mL IntraVENous Q8H    sodium chloride (NS) flush 5-10 mL  5-10 mL IntraVENous PRN    naloxone (NARCAN) injection 0.4 mg  0.4 mg IntraVENous PRN    acetaminophen (TYLENOL) tablet 650 mg  650 mg Oral Q4H PRN    diphenhydrAMINE (BENADRYL) capsule 25 mg  25 mg Oral Q4H PRN    ondansetron (ZOFRAN) injection 4 mg  4 mg IntraVENous Q4H PRN    enoxaparin (LOVENOX) injection 150 mg  150 mg SubCUTAneous Q12H       I have reviewed the nurses notes, vitals, problem list, allergy list, medical history, family, social history and medications. REVIEW OF SYMPTOMS      General: Pt denies excessive weight gain or loss. Pt is able to conduct ADL's  HEENT: Denies blurred vision, headaches, hearing loss, epistaxis and difficulty swallowing. Respiratory: Denies cough, congestion, shortness of breath, APARICIO, wheezing or stridor. Cardiovascular: Denies precordial pain, palpitations, edema or PND  Gastrointestinal: Denies poor appetite, indigestion, abdominal pain or blood in stool  Genitourinary: Denies hematuria, dysuria, increased urinary frequency  Musculoskeletal: Denies joint pain or swelling from muscles or joints  Neurologic: Denies tremor, paresthesias, headache, or sensory motor disturbance  Psychiatric: Denies confusion, insomnia, depression  Integumentray: Denies rash, itching or ulcers. Hematologic: Denies easy bruising, bleeding       PHYSICAL EXAMINATION      Vitals:    12/23/18 0338 12/23/18 0407 12/23/18 0700 12/23/18 0730   BP: 105/70   143/75   Pulse: 97  89 91   Resp: 18   18   Temp: 98.7 °F (37.1 °C)   98.8 °F (37.1 °C)   SpO2: 95% 96%  98%   Weight: 324 lb 1.2 oz (147 kg)      Height:         General: Well developed, in no acute distress. HEENT: No jaundice, oral mucosa moist, no oral ulcers  Neck: Supple, no stiffness, no lymphadenopathy, supple  Heart:  Normal S1/S2 negative S3 or S4. Regular, no murmur, gallop or rub, no jugular venous distention  Respiratory: Clear bilaterally x 4, no wheezing or rales  Abdomen:   Soft, non-tender, bowel sounds are active.   Extremities:  No edema, normal cap refill, no cyanosis. Musculoskeletal: No clubbing, no deformities  Neuro: A&Ox3, speech clear, gait stable, cooperative, no focal neurologic deficits  Skin: Skin color is normal. No rashes or lesions.  Non diaphoretic, moist.  Vascular: 2+ pulses symmetric in all extremities       DIAGNOSTIC DATA      EKG: Sinus rhythm with incomplete RBBB    TELE: Sinus rhythm       LABORATORY DATA      Lab Results   Component Value Date/Time    WBC 11.5 (H) 12/22/2018 07:55 AM    HGB 13.8 12/22/2018 07:55 AM    HCT 42.4 12/22/2018 07:55 AM    PLATELET 192 83/38/0972 07:55 AM    MCV 89.5 12/22/2018 07:55 AM      Lab Results   Component Value Date/Time    Sodium 137 12/22/2018 07:55 AM    Potassium 4.1 12/22/2018 07:55 AM    Chloride 102 12/22/2018 07:55 AM    CO2 26 12/22/2018 07:55 AM    Anion gap 9 12/22/2018 07:55 AM    Glucose 215 (H) 12/22/2018 07:55 AM    BUN 16 12/22/2018 07:55 AM    Creatinine 1.28 12/22/2018 07:55 AM    BUN/Creatinine ratio 13 12/22/2018 07:55 AM    GFR est AA >60 12/22/2018 07:55 AM    GFR est non-AA 59 (L) 12/22/2018 07:55 AM    Calcium 8.6 12/22/2018 07:55 AM    Bilirubin, total 0.5 12/22/2018 07:55 AM    AST (SGOT) 34 12/22/2018 07:55 AM    Alk. phosphatase 66 12/22/2018 07:55 AM    Protein, total 7.7 12/22/2018 07:55 AM    Albumin 3.4 (L) 12/22/2018 07:55 AM    Globulin 4.3 (H) 12/22/2018 07:55 AM    A-G Ratio 0.8 (L) 12/22/2018 07:55 AM    ALT (SGPT) 39 12/22/2018 07:55 AM           ASSESSMENT      1. Pulmonary embolism  2. Positive troponin - likely secondary to RV strain  3. Syncope - suspect secondary to #1  4. Cervical stenosis of spinal canal  5. Obesity  6. Chest pain  7. Incomplete RBBB       PLAN     Unclear whether troponin elevation and syncope are secondary to an ischemic event however suspect more likely related to pulmonary embolism. Will obtain echocardiogram and evaluate for wall motion abnormalities. Trend cardiac enzymes. Continue anticoagulation. If echocardiogram normal and enzymes downtrend, will plan for 30 day monitor as OP to further evaluate syncope and will consider stress testing as OP in future once recovered from PE. Thank you, Joaquín Ovalles MD and Dr. Osmani Ward for involving me in the care of this extraordinarily pleasant male.  Please do not hesitate to contact me for further questions/concerns.          Travis Dey MD  Cardiac Electrophysiology / Cardiology    [facility].  [address]    [address]  [phone] / [phone] Fax   [phone] / [phone] Fax

## 2018-12-23 NOTE — PROGRESS NOTES
[facility]  [address] [facility] [place], [place], [address] [place] [address]  [phone]      Medical Progress Note      NAME:         Chantel Irwin. :        1965  MRM:        [id number]    Date:          2018      Subjective: Patient has been seen and examined as a follow up for multiple medical issues. Chart, labs, diagnostics reviewed. He says he has no chest pain, SOB or dizziness. Minimal shoulder discomfort, No fever or chills. Objective:    Vital Signs:    Visit Vitals  /75 (BP 1 Location: Left arm, BP Patient Position: At rest)   Pulse 91   Temp 98.8 °F (37.1 °C)   Resp 18   Ht 5' 10\" (1.778 m)   Wt 147 kg (324 lb 1.2 oz)   SpO2 98%   BMI 46.50 kg/m²          Intake/Output Summary (Last 24 hours) at 2018 1013  Last data filed at 2018 9212  Gross per 24 hour   Intake 360 ml   Output 550 ml   Net -190 ml        Physical Examination:    General:   Well looking and nourished patient in no acute distress  Eyes:   pink conjunctivae, PERRLA with no discharge. ENT:   no ottorrhea or rhinorrhea with moist mucous membranes  Neck: no masses, thyroid non-tender and trachea central.  Pulm:  no accessory muscle use, decreased but clear breath sounds without crackles or wheezes  Card:  no JVD or murmurs, has regular and normal S1, S2 without thrills, bruits or peripheral edema  Abd:  Soft, non-tender, non-distended, normoactive bowel sounds with no palpable organomegaly  Musc:  No cyanosis, clubbing, atrophy or deformities. Sling R shoulder  Skin:  No rashes, bruising or ulcers. Neuro: Awake and alert.  Generally a non focal exam. Follows commands appropriately  Psych:  Has a good insight and is oriented x 3    Current Facility-Administered Medications   Medication Dose Route Frequency    cyclobenzaprine (FLEXERIL) tablet 10 mg  10 mg Oral TID PRN    docusate sodium (COLACE) capsule 100 mg  100 mg Oral BID    oxyCODONE-acetaminophen (PERCOCET) 5-325 mg per tablet 1 Tab  1 Tab Oral Q6H PRN    sodium chloride (NS) flush 5-10 mL  5-10 mL IntraVENous Q8H    sodium chloride (NS) flush 5-10 mL  5-10 mL IntraVENous PRN    naloxone (NARCAN) injection 0.4 mg  0.4 mg IntraVENous PRN    acetaminophen (TYLENOL) tablet 650 mg  650 mg Oral Q4H PRN    diphenhydrAMINE (BENADRYL) capsule 25 mg  25 mg Oral Q4H PRN    ondansetron (ZOFRAN) injection 4 mg  4 mg IntraVENous Q4H PRN    enoxaparin (LOVENOX) injection 150 mg  150 mg SubCUTAneous Q12H        Laboratory data and review:    Recent Labs     12/22/18  0755   WBC 11.5*   HGB 13.8   HCT 42.4        Recent Labs     12/22/18  0755      K 4.1      CO2 26   *   BUN 16   CREA 1.28   CA 8.6   MG 1.9   ALB 3.4*   SGOT 34   ALT 39   INR 1.0     No components found for: [place]    Other Diagnostics:    Telemetry reviewed:   normal sinus rhythm    Assessment and Plan:    Bilateral pulmonary embolism ([address] (12/22/2018): trigger presumed to be immobility following recent surgery. Venous doppler lower extremity and RUE neg for acute DVT. This makes it likely unprovoked. Continue enoxaparin with plan to start a DOAC    Syncope and collapse (12/23/2018)/ Chest pain POA: new. No Hx of CAD. Troponin up trending. Still suspect strain from PE. Echo pending. Cardiology consult pending. Continue enoxaparin     Acute hypoxia POA, presumed due to PE. Oxygen by NC as needed. Check 6 minute walk prior to DC     Cervical stenosis of spinal canal / Chronic pain / Arthritis POA: reviewed by orthopedic surgery. Continue Tylenol, flexeril, gabpentin or percocet prn     Morbid obesity with BMI of 45.0-49.9 POA: ddvise weight loss already. Likely has DREW hypoventilation syndrome. Needs outpatient testing after PE resolved    Hyperglycemia POA: A1c 7.0.  Monitor for now    Total time spent for the patient's care: [address] discussed with: Patient, Family and Nursing Staff    Discussed:  Care Plan and D/C Planning    Prophylaxis:  Lovenox    Anticipated Disposition:  Home w/Family           ___________________________________________________    Attending Physician:   Araceli Shore MD

## 2018-12-24 ENCOUNTER — CLINICAL SUPPORT (OUTPATIENT)
Dept: CARDIOLOGY CLINIC | Age: 53
End: 2018-12-24

## 2018-12-24 VITALS
DIASTOLIC BLOOD PRESSURE: 69 MMHG | OXYGEN SATURATION: 95 % | HEIGHT: 70 IN | RESPIRATION RATE: 16 BRPM | SYSTOLIC BLOOD PRESSURE: 126 MMHG | WEIGHT: 315 LBS | BODY MASS INDEX: 45.1 KG/M2 | HEART RATE: 68 BPM | TEMPERATURE: 97.8 F

## 2018-12-24 DIAGNOSIS — R55 SYNCOPE, UNSPECIFIED SYNCOPE TYPE: Primary | ICD-10-CM

## 2018-12-24 LAB
BACTERIA SPEC CULT: NORMAL
CC UR VC: NORMAL
LACTATE SERPL-SCNC: 1.5 MMOL/L (ref 0.4–2)
LACTATE SERPL-SCNC: 2.5 MMOL/L (ref 0.4–2)
SERVICE CMNT-IMP: NORMAL
TROPONIN I SERPL-MCNC: 0.28 NG/ML

## 2018-12-24 PROCEDURE — 74011250637 HC RX REV CODE- 250/637: Performed by: INTERNAL MEDICINE

## 2018-12-24 PROCEDURE — 74011250636 HC RX REV CODE- 250/636: Performed by: INTERNAL MEDICINE

## 2018-12-24 PROCEDURE — 84484 ASSAY OF TROPONIN QUANT: CPT

## 2018-12-24 PROCEDURE — 83605 ASSAY OF LACTIC ACID: CPT

## 2018-12-24 PROCEDURE — 36415 COLL VENOUS BLD VENIPUNCTURE: CPT

## 2018-12-24 RX ORDER — SODIUM CHLORIDE 9 MG/ML
100 INJECTION, SOLUTION INTRAVENOUS CONTINUOUS
Status: DISCONTINUED | OUTPATIENT
Start: 2018-12-24 | End: 2018-12-24 | Stop reason: HOSPADM

## 2018-12-24 RX ADMIN — Medication 10 ML: at 04:52

## 2018-12-24 RX ADMIN — ENOXAPARIN SODIUM 150 MG: 150 INJECTION SUBCUTANEOUS at 10:27

## 2018-12-24 RX ADMIN — SODIUM CHLORIDE 100 ML/HR: 900 INJECTION, SOLUTION INTRAVENOUS at 08:06

## 2018-12-24 NOTE — PROGRESS NOTES
12/24/2018   2:17 PM  CM placed TC to pharmacy, pt price is $75.00 for Rx, CM notified pt and gave him Eliquis coupon. Cuate Dailey      2:00 PM  Dr. Hill Collazo requested CM price Eliquis and check availability, Rx faxed to [facility].   Cuate Dailey

## 2018-12-24 NOTE — PROGRESS NOTES
Problem: Falls - Risk of  Goal: *Absence of Falls  Document Brian Fall Risk and appropriate interventions in the flowsheet. Outcome: Progressing Towards Goal  Fall Risk Interventions:            Medication Interventions: Teach patient to arise slowly         History of Falls Interventions:  Investigate reason for fall

## 2018-12-24 NOTE — PROGRESS NOTES
Bradly Ellison MD. [facility]              Patient: Mars Gaxiola. : 1965      Today's Date: 2018        CARDIOLOGY PROGRESS NOTE  S: Doing better. No CP or SOB. O:   Physical Exam:  Visit Vitals  /69 (BP 1 Location: Left arm, BP Patient Position: At rest)   Pulse 68   Temp 97.8 °F (36.6 °C)   Resp 16   Ht 5' 10\" (1.778 m)   Wt 319 lb 10.7 oz (145 kg)   SpO2 95%   BMI 45.87 kg/m²     Patient appears generally well, mood and affect are appropriate and pleasant. HEENT:  Hearing intact, non-icteric, normocephalic, atraumatic. Neck Exam: Supple   Lung Exam: Clear to auscultation, even breath sounds. Cardiac Exam: Regular rate and rhythm with no murmur  Abdomen: Soft, non-tender, obese  Extremities: Right arm in brace, No lower extremity edema. Radha Hobby Psych: Appropriate affect  Neuro - Grossly intact          LABS / OTHER STUDIES:       Recent Results (from the past 24 hour(s))   TROPONIN I    Collection Time: 18  4:53 AM   Result Value Ref Range    Troponin-I, Qt. 0.28 (H) <0.05 ng/mL   LACTIC ACID    Collection Time: 18  6:52 AM   Result Value Ref Range    Lactic acid 2.5 (HH) 0.4 - 2.0 MMOL/L   LACTIC ACID    Collection Time: 18  3:36 PM   Result Value Ref Range    Lactic acid 1.5 0.4 - 2.0 MMOL/L               CARDIAC DIAGNOSTICS:     Cardiac Evaluation Includes:    LE Venous Dopplers 18 - Bilateral lower extremity venous duplex negative for deep venous thrombosis    Chest CTA 18 - Positive bilateral pulmonary emboli. Multiple new right pleural parenchymal nodules     EKG 18 - sinus tach, RBBB, PRWP    Echo 18 - I viewed echo images myself. LVEF 55-60% without WMA. RV dilated with reduced function. ASSESSMENT AND PLAN:     Assessment and Plan:  Mars Parrish is a 46 y.o. male with history of obesity, smoking, recent shoulder surgery who presents to ER with CP and syncope. Trop was 1.86. Chest CTA showed acute PE.        1) Syncope, CP, and elevated troponin level (peak 1.86)   - suspect syncope and Type 2 MI due to acute bilat PE   - No ischemic EKG changes or regional WMA on echo. - There is appearance of RV strain on the echo - I viewed images myself. - plan for a stress test as outpatient once he recovers from acute PE.   - Check a 30 day event monitor to rule out arrythmias  - I told him no driving for now (which he cannot do for next 6 weeks due to recent surgery)     2) Acute PE  - OAC per pulmonary     3) Recent Shoulder surgery   - status post R RCR with arthroscopy and decompression by Dr. Cristina Hagan 12/18     4) OK to [place] home. My office will arrange FU. Luz Elena De Jesus MD, [place] [address].  [address]  Ph: [phone]    [phone]

## 2018-12-24 NOTE — PROGRESS NOTES
Change of Shift Report:    1915:  Bedside and Verbal shift change report given to [address] [place] [address] Avenue, RN (oncoming nurse) by Reynaldo Colin RN (offgoing nurse). Report included the following information SBAR, Kardex, MAR, Accordion, Recent Results and Cardiac Rhythm NSR. Shift Summary:    2010:  Patient's vitals taken and patient assessment completed. Patient AOx4. No complaints of pain. 2137:  Patient's scheduled meds given. 2323:  Patient's vitals taken. 9255:  Patient's vitals taken. 0500:  Trop drawn and sent to lab. 0630:  Lactic acid drawn and sent to lab. 0730: Bedside and Verbal shift change report given to Marbella Aquino RN (oncoming nurse) by [address] [place] [address] Avenue, RN (offgoing nurse). Report included the following information SBAR, Kardex, ED Summary, MAR, Accordion, Recent Results and Cardiac Rhythm NSR.

## 2018-12-24 NOTE — DISCHARGE SUMMARY
[facility]  [address]  Tel: [phone]    Physician Discharge Summary    Patient ID:    Eden Fountain. Age:              46 y.o.    : 1965  MRN:             [id number]     PCP: Bobie Lesch, MD     Admit date: 2018    Discharge date: 2018    Principal admission Diagnosis:   Pulmonary emboli [facility])    Discharge Diagnoses:  Principal Problem:    Bilateral pulmonary embolism ([facility] (2018)    Syncope and collapse (2018)    Cervical stenosis of spinal canal (2018)    Morbid obesity with BMI of 45.0-49.9, adult ([address] (2018): Overview: BMI= 45.9    Chronic pain: Overview: lower back pain    Arthritis     Consults: Orthopedic Surgery    Hospital Course:     Mr. Maddie Saunders is a 46 y.o. admitted to [facility] and treated for the following:    Bilateral pulmonary embolism ([facility] (2018): initial trigger presumed to be immobility following recent surgery. Venous doppler lower extremity and RUE neg for acute DVT. This makes it likely unprovoked. Has been on enoxaparin and will transition to Eliquis. Advised outpatient follow up with hematology as well as PCP for an age appropriate cancer screening.      Syncope and collapse (2018)/ Chest pain POA: new. No Hx of CAD.  Troponin mildly elevated. Echocardiogram showed a normal LV function with no wall motion abnormalities. Seen by cardiology who plan for an outpatient stress testing and 30 day monitor.       Acute hypoxia POA, presumed due to PE. Has been off oxygen without symptoms. No need for home oxygen. Outpatient sleep study testing     Cervical stenosis of spinal canal / Chronic pain / Arthritis POA: reviewed by orthopedic surgery. Continue pain control with an outpatient follow up as scheduled.      Morbid obesity with BMI of 45.0-49.9 POA: ddvise weight loss already. Yadiel McDonald has DREW hypoventilation syndrome.  Needs outpatient testing after PE resolved     Hyperglycemia POA: A1c 7.0. Out patient monitoring     Discharge Exam:    Visit Vitals  /69 (BP 1 Location: Left arm, BP Patient Position: At rest)   Pulse 68   Temp 97.8 °F (36.6 °C)   Resp 16   Ht 5' 10\" (1.778 m)   Wt 145 kg (319 lb 10.7 oz)   SpO2 95%   BMI 45.87 kg/m²      General: well looking and stable patient in no acute distress  Pulm: clear breath sounds without wheezes  Card: no murmurs, normal S1, S2 without thrills, bruits   Abd:    soft, non-tender, normoactive bowel sounds  Skin: no rashes or ulcers, skin turgor is good  Neuro: awake, alert and has a non focal     Activity: Activity as tolerated    Diet: Regular Diet    Current Discharge Medication List      START taking these medications    Details   apixaban (ELIQUIS) 5 mg tablet Take 2 tabs (10 mg) orally BID x 7 days then 1 tab (5mg) orally BID thereafter as directed  Qty: 60 Tab, Refills: 2         CONTINUE these medications which have NOT CHANGED    Details   docusate sodium (COLACE) 100 mg capsule Take 100 mg by mouth two (2) times daily as needed for Constipation. oxyCODONE-acetaminophen (PERCOCET) 5-325 mg per tablet Take 1-2 Tabs by mouth every six (6) hours as needed for Pain. Max Daily Amount: 8 Tabs. Qty: 80 Tab, Refills: 0    Associated Diagnoses: Cervical stenosis of spinal canal      promethazine (PHENERGAN) 25 mg tablet Take 1 Tab by mouth every six (6) hours as needed for Nausea. Qty: 30 Tab, Refills: 0         STOP taking these medications       aspirin delayed-release 325 mg tablet Comments:   Reason for Stopping:         cyclobenzaprine (FLEXERIL) 10 mg tablet Comments:   Reason for Stopping:                Follow-up Information     Follow up With Specialties Details Why Nery Castro MD Hematology and Oncology Schedule an appointment as soon as possible for a visit for review for unprovoked PE [address] [facility] [address] [address]  [phone]      Linh Fatima MD [facility] Call to schedule regular follow up [address] [place] [address]  [address]      Bonita Boxer, MD Orthopedic Surgery Go to office for follow up as scheduled [address] [facility]  [address]  [address] [facility]  [phone]            Follow-up tests or labs: None    Discharge Condition: Stable    Disposition: home    Time taken to arrange discharge:  35 minutes.     Signed:  Sheree Han MD     [facility]  12/24/2018   2:29 PM

## 2018-12-24 NOTE — PROGRESS NOTES
[facility]       PULMONARY DAILY PROGRESS NOTE  Pulmonary, Critical Care, and Sleep Medicine    Name: Sury Rodriguez. MRN: [id number]   : 1965 Hospital: [address] [facility]   Date: 2018  Admission Date: 2018     Chart and notes reviewed. Data reviewed. I have evaluated and examined the patient. Overnight events reviewed:  He continues to feel better. He no longer needs supplemental oxygen. Dopplers negative for clot. He denies signs of bleeding. ROS:     Vital Signs:  Visit Vitals  /69 (BP 1 Location: Left arm, BP Patient Position: At rest)   Pulse 68   Temp 97.8 °F (36.6 °C)   Resp 16   Ht 5' 10\" (1.778 m)   Wt 145 kg (319 lb 10.7 oz)   SpO2 95%   BMI 45.87 kg/m²       O2 Device: Room air   O2 Flow Rate (L/min): 3 l/min   Temp (24hrs), Av.4 °F (36.9 °C), Min:97.8 °F (36.6 °C), Max:99.3 °F (37.4 °C)       Intake/Output:   Last shift:      No intake/output data recorded. Last 3 shifts:  1901 -  0700  In: 360 [P.O.:360]  Out: 200 [Urine:200]    Intake/Output Summary (Last 24 hours) at 2018 1159  Last data filed at 2018 0952  Gross per 24 hour   Intake    Output 200 ml   Net -200 ml        Physical Exam:   General:  Alert, cooperative, no distress, appears stated age. Head:  Normocephalic, atraumatic. Eyes:  Anicteric. Throat: Lips, mucosa, and tongue normal. Teeth and gums normal.   Neck: Supple, no adenopathy, no JVD. Back:   Symmetric, no curvature. ROM normal.   Lungs:   Clear to auscultation bilaterally. Chest wall:  No tenderness or deformity. Heart:  Regular rate and rhythm, S1, S2 normal, no murmur, click, rub or gallop. Abdomen:   Soft, non-tender. Bowel sounds normal. No masses,  No organomegaly. Extremities:  lower legs are tight, Right shoulder in a sling, atraumatic, no cyanosis or edema. Pulses: 2+ and symmetric all extremities.    Skin: Skin color, texture, turgor normal. No rashes or lesions   Lymph nodes: Cervical, supraclavicular, and axillary nodes normal.   Neurologic: Grossly nonfocal     DATA:  MAR reviewed and pertinent medications noted or modified as needed    Labs:  Recent Labs     12/22/18  0755   WBC 11.5*   HGB 13.8   HCT 42.4        Recent Labs     12/22/18  0755      K 4.1      CO2 26   *   BUN 16   CREA 1.28   CA 8.6   MG 1.9   ALB 3.4*   TBILI 0.5   SGOT 34   ALT 39   INR 1.0       Imaging:  I have personally reviewed the patients radiographs and reports. Dopplers are negative      IMPRESSION:   · Bilateral PEs  · Increased troponin due to heart strain  · Hypoxemia resolved  · S/p right shoulder and back surgery  · Probable DREW      PLAN:   · Transition to Eliquis 10 mg po bid for 10 days then 5 mg po bid  · Follow up with me for an outpatient sleep evaluation.   · Will see janis Walker MD, [facility]  [facility]

## 2018-12-28 LAB
BACTERIA SPEC CULT: NORMAL
SERVICE CMNT-IMP: NORMAL

## 2018-12-31 ENCOUNTER — DOCUMENTATION ONLY (OUTPATIENT)
Dept: CARDIOLOGY CLINIC | Age: 53
End: 2018-12-31

## 2018-12-31 NOTE — PROGRESS NOTES
Received a serious loop alert.     12/30/18  3:20 am  Auto Triggered  Sinus Rhythm w/Run of V-Tach (7 beats) with rates up to 199 BPM    Informed Dr Jhon Camargo

## 2019-01-01 ENCOUNTER — TELEPHONE (OUTPATIENT)
Dept: CARDIOLOGY CLINIC | Age: 54
End: 2019-01-01

## 2019-01-01 NOTE — TELEPHONE ENCOUNTER
Event Monitor 12/30/18  3:20 am  Auto Triggered  Sinus Rhythm w/Run of V-Tach (7 beats) with rates up to 199 BPM    Continue to monitor.

## 2019-01-06 NOTE — PROGRESS NOTES
[employer] [facility] at [address] [place] [address] [phone] Hematology / Oncology Consult Reason for Visit:  
Aly Lozano is a 46 y.o. male who is seen in consultation at the request of Dr. Vince Mota for evaluation of PE. History of Present Illness:  
Mr. Juan Connolly is a 46 y.o. male who comes in for evaluation of recently diagnosed bilateral pulmonary emboli. He underwent right shoulder surgery on 12/17/18. He was instructed to take ASA 81mg/d which he was doing. Then on 12/22/18, he had syncopal episode at home and went in to the ED. He was found to have bilateral pulmonary emboli. Venous doppler lower extremity and RUE neg for acute DVT. He also had cervical (C5-C6) spinal fusion surgery in 8/2018. He was smoking cigarettes, but quit in June 2018. No prior personal history and no family h/o venous thromboembolism (VTE). Never had a colonoscopy. He is currently taking Eliquis 5mg bid as prescribed. Given syncopal episode and collapse as well as elevated troponin, patient was evaluated by Cardiology. Echocardiogram showed a normal LV function with no wall motion abnormalities. Pt is scheduled for an outpatient stress test and 30 day monitor. [facility] was also found to have hyperglycemia with HgbA1c 7.0. He states he is working on diet and exercise first, no medications yet. Past Medical History:  
Diagnosis Date  Arthritis  Chronic pain   
 lower back pain  Morbid obesity with BMI of 45.0-49.9, adult ([facility] 08/01/2018 BMI= 45.9 Past Surgical History:  
Procedure Laterality Date  HX HERNIA REPAIR  33/0593  
 umbilical  
 HX HERNIA REPAIR  2016  
 incisional  (above umbilicar repair)  HX KNEE ARTHROSCOPY Right 1996  HX OPEN CHOLECYSTECTOMY  2014  HX OTHER SURGICAL Left   
 inguinal hernia repair  HX SHOULDER ARTHROSCOPY Left 2010 Social History Tobacco Use  Smoking status: Former Smoker Packs/day: 1.00 Years: 15.00   Pack years: 15.00  
 Last attempt to quit: 2018 Years since quittin.5  Smokeless tobacco: Never Used Substance Use Topics  Alcohol use: Yes Alcohol/week: 0.5 oz Types: 1 Cans of beer per week Comment: rare Family History Problem Relation Age of Onset  Hypertension Mother  Diabetes Mother  Arrhythmia Mother   
     pacemaker  No Known Problems Father  Hypertension Sister  Diabetes Sister  Hypertension Brother  No Known Problems Brother  No Known Problems Brother Current Outpatient Medications Medication Sig  
 apixaban (ELIQUIS) 5 mg tablet Take 2 tabs (10 mg) orally BID x 7 days then 1 tab (5mg) orally BID thereafter as directed  docusate sodium (COLACE) 100 mg capsule Take 100 mg by mouth two (2) times daily as needed for Constipation.  oxyCODONE-acetaminophen (PERCOCET) 5-325 mg per tablet Take 1-2 Tabs by mouth every six (6) hours as needed for Pain. Max Daily Amount: 8 Tabs.  promethazine (PHENERGAN) 25 mg tablet Take 1 Tab by mouth every six (6) hours as needed for Nausea. No current facility-administered medications for this visit. No Known Allergies Review of Systems: A complete review of systems was obtained, negative except as described above. Physical Exam:  
 
Visit Vitals /77 (BP 1 Location: Left arm, BP Patient Position: Sitting) Pulse 89 Temp 97.3 °F (36.3 °C) (Oral) Ht 5' 10\" (1.778 m) Wt 311 lb 9.6 oz (141.3 kg) SpO2 98% BMI 44.71 kg/m² ECOG PS: 0 General: Well developed, no acute distress, obese Eyes: PERRLA, EOMI, anicteric sclerae HENT: Atraumatic, OP clear, TMs intact without erythema Neck: Supple Lymphatic: No cervical, supraclavicular, axillary or inguinal adenopathy Respiratory: CTAB, normal respiratory effort CV: Normal rate, regular rhythm, no murmurs, no peripheral edema GI: Soft, nontender, nondistended, no masses, no hepatomegaly, no splenomegaly MS: Normal gait and station. Digits without clubbing or cyanosis. R arm in a sling. Skin: No rashes, ecchymoses, or petechiae. Normal temperature, turgor, and texture. Neuro/Psych: Alert, oriented. 5/5 strength in all 4 extremities. Appropriate affect, normal judgment/insight. Results:  
 
Lab Results Component Value Date/Time WBC 11.5 (H) 12/22/2018 07:55 AM  
 HGB 13.8 12/22/2018 07:55 AM  
 HCT 42.4 12/22/2018 07:55 AM  
 PLATELET 112 68/77/1037 07:55 AM  
 MCV 89.5 12/22/2018 07:55 AM  
 ABS. NEUTROPHILS 5.3 12/22/2018 07:55 AM  
 
Lab Results Component Value Date/Time Sodium 137 12/22/2018 07:55 AM  
 Potassium 4.1 12/22/2018 07:55 AM  
 Chloride 102 12/22/2018 07:55 AM  
 CO2 26 12/22/2018 07:55 AM  
 Glucose 215 (H) 12/22/2018 07:55 AM  
 BUN 16 12/22/2018 07:55 AM  
 Creatinine 1.28 12/22/2018 07:55 AM  
 GFR est AA >60 12/22/2018 07:55 AM  
 GFR est non-AA 59 (L) 12/22/2018 07:55 AM  
 Calcium 8.6 12/22/2018 07:55 AM  
 
Lab Results Component Value Date/Time Bilirubin, total 0.5 12/22/2018 07:55 AM  
 ALT (SGPT) 39 12/22/2018 07:55 AM  
 AST (SGOT) 34 12/22/2018 07:55 AM  
 Alk. phosphatase 66 12/22/2018 07:55 AM  
 Protein, total 7.7 12/22/2018 07:55 AM  
 Albumin 3.4 (L) 12/22/2018 07:55 AM  
 Globulin 4.3 (H) 12/22/2018 07:55 AM  
 
No results found for: IRON, FE, TIBC, IBCT, PSAT, FERR Lab Results Component Value Date/Time Vitamin B12 537 12/22/2018 09:40 AM  
 
No results found for: TSH, TSH2, TSH3, TSHP, TSHEXT Lab Results Component Value Date/Time Hepatitis A, IgM NONREACTIVE 12/22/2018 09:40 AM  
 Hepatitis B surface Ag <0.10 12/22/2018 09:40 AM  
 Hepatitis B core, IgM NONREACTIVE 12/22/2018 09:40 AM  
 
 
 
Imaging:  
 
Chest CTA 12/22/18:  
FINDINGS: 
Right middle lobe 6.3 mm pleural parenchymal nodule (image 50). Right lower lobe 5.9 mm pleural parenchymal nodule (image 45).  2 mm right middle lobe pleural 
 parenchymal nodule (image 27). Right upper lobe 6.6 mm pleural parenchymal 
nodule (image 66). Right upper lobe posterior 6.9 mm pleural parenchymal nodule 
(image 72). Positive cast-like pulmonary emboli in the right main pulmonary artery, 
extending into the right upper lobe and lower lobe. Left pulmonary artery 
catheter-like emboli extending into the lingula and left lower lobe. There is no mediastinal or hilar adenopathy or mass. The aorta enhances normally 
without evidence of aneurysm or dissection. The visualized portions of the upper abdominal organs are normal. 
IMPRESSION: 
Positive bilateral pulmonary emboli. Multiple new right pleural parenchymal nodules as described 
  
Assessment & Plan:  
Paula Leong. is a 46 y.o. male comes in for evaluation and management. 1. Bilateral pulmonary emboli: Provoked by recent surgery and immobility. Obesity is also a minor risk factor. Testing for inherited thrombophilias is not recommended in patients with a provoked VTE. I recommend that patient continue anticoagulation for 6 months. No elective procedures that would require interrupting anticoagulation in the first 3 months. Patient is overdue for and will need a screening colonoscopy this year, but will plan for this after he completes anticoagulation. -- Continue anticoagulation for 6 months. On eliquis 5mg bid. -- Return in 3 months for f/u 2. Pulmonary nodules: Discussed during thoracic tumor board, are peripheral and easily accessible if biopsy needed in the future, but appear benign at this time. -- Repeat chest CT in 3 months per [facility] Society guidelines - will review results at next visit 3. Obesity: Counseled on weight loss through calorie counting, dietary portion control, and daily activity/exercise. He is using the cookdinner elizabeth. 
 
4. Hyperglycemia / type II DM: Sees ADRIEN William who works with Dr. Rogerio Quiñones. Pt will start on diet and exercise.  He is doing calorie counting. 5. Elevated troponin: Evaluated by Dr. Samantha Mccarthy and is scheduled for stress test in the near future. I appreciate the opportunity to participate in Tanika Marisa Sandrine Baker's care. Signed By: Silvia Muir MD   
 January 6, 2019

## 2019-01-08 ENCOUNTER — OFFICE VISIT (OUTPATIENT)
Dept: ONCOLOGY | Age: 54
End: 2019-01-08

## 2019-01-08 VITALS
SYSTOLIC BLOOD PRESSURE: 112 MMHG | HEIGHT: 70 IN | WEIGHT: 311.6 LBS | OXYGEN SATURATION: 98 % | HEART RATE: 89 BPM | BODY MASS INDEX: 44.61 KG/M2 | TEMPERATURE: 97.3 F | DIASTOLIC BLOOD PRESSURE: 77 MMHG

## 2019-01-08 DIAGNOSIS — E11.9 TYPE 2 DIABETES MELLITUS WITHOUT COMPLICATION, WITHOUT LONG-TERM CURRENT USE OF INSULIN (HCC): ICD-10-CM

## 2019-01-08 DIAGNOSIS — R91.8 PULMONARY NODULES: ICD-10-CM

## 2019-01-08 DIAGNOSIS — I26.99 OTHER ACUTE PULMONARY EMBOLISM WITHOUT ACUTE COR PULMONALE (HCC): Primary | ICD-10-CM

## 2019-01-08 DIAGNOSIS — E66.01 CLASS 3 SEVERE OBESITY DUE TO EXCESS CALORIES WITHOUT SERIOUS COMORBIDITY WITH BODY MASS INDEX (BMI) OF 40.0 TO 44.9 IN ADULT (HCC): ICD-10-CM

## 2019-01-10 ENCOUNTER — TELEPHONE (OUTPATIENT)
Dept: CARDIOLOGY CLINIC | Age: 54
End: 2019-01-10

## 2019-01-10 NOTE — TELEPHONE ENCOUNTER
Given elevated  Troponin and NSVT, will check a [employer] cardiolite (2 day study) to rule out CAD. He did have an acute PE so suspect he had a Type 2 MI.

## 2019-01-16 ENCOUNTER — TELEPHONE (OUTPATIENT)
Dept: CARDIOLOGY CLINIC | Age: 54
End: 2019-01-16

## 2019-01-16 NOTE — TELEPHONE ENCOUNTER
ABIGAIL Abdi Echo Dr. Tiffanie Olson order most likely will be DENIED because the pt just had one at the ER on 12/22/18. Do you want me to still try or just do the Nuc.     [employer]

## 2019-01-24 ENCOUNTER — CLINICAL SUPPORT (OUTPATIENT)
Dept: CARDIOLOGY CLINIC | Age: 54
End: 2019-01-24

## 2019-01-24 ENCOUNTER — DOCUMENTATION ONLY (OUTPATIENT)
Dept: CARDIOLOGY CLINIC | Age: 54
End: 2019-01-24

## 2019-01-24 DIAGNOSIS — E66.01 MORBID OBESITY WITH BMI OF 45.0-49.9, ADULT (HCC): ICD-10-CM

## 2019-01-24 DIAGNOSIS — I26.99 BILATERAL PULMONARY EMBOLISM (HCC): ICD-10-CM

## 2019-01-24 DIAGNOSIS — R55 SYNCOPE AND COLLAPSE: ICD-10-CM

## 2019-01-24 DIAGNOSIS — I26.99 BILATERAL PULMONARY EMBOLISM (HCC): Primary | ICD-10-CM

## 2019-01-24 DIAGNOSIS — R07.9 CHEST PAIN, UNSPECIFIED TYPE: Primary | ICD-10-CM

## 2019-01-24 NOTE — PROGRESS NOTES
See scanned report. Dr. Sanchez Husbands ordered study and Dr. Sanchez Husbands read study. ID verified per protocol. Explained procedure and risks to patient. All concerns and questions ansewered prior to obtaining consent test. Patient developed dyspnea during test. At 1 minute in recovery, patient without symptoms or complaints voiced.

## 2019-01-24 NOTE — PROGRESS NOTES
Per Dr. Tiffanie lOson, utilize [facility]. ID verified per protocol. Test and risks explained to patient. Consent signed after all questions answered. Started saline lock #22 gauge in Left ACF. 1 stick- started earlier for nuclear study. Good blood return and flushed without difficulty. At 11:20 am, activated Definity (1.3 mL in 8.7 ml NS) injected  X 4. (Total 8.6 vmLs given). No complaint of voiced. Echo images obtained. Removed saline lock and applied pressure until homeostasis achieved. Dressing applied. Patient instructed to leave dressing on times 1 hr. Verbalized understanding. Patient waited in echo room until echocrdiogram completed. Patient  without complaints of voiced.

## 2019-01-25 ENCOUNTER — CLINICAL SUPPORT (OUTPATIENT)
Dept: CARDIOLOGY CLINIC | Age: 54
End: 2019-01-25

## 2019-01-25 DIAGNOSIS — R07.9 CHEST PAIN, UNSPECIFIED TYPE: ICD-10-CM

## 2019-01-25 DIAGNOSIS — R55 SYNCOPE AND COLLAPSE: ICD-10-CM

## 2019-01-25 DIAGNOSIS — E66.01 MORBID OBESITY WITH BMI OF 45.0-49.9, ADULT (HCC): ICD-10-CM

## 2019-01-25 DIAGNOSIS — I26.99 BILATERAL PULMONARY EMBOLISM (HCC): Primary | ICD-10-CM

## 2019-01-26 ENCOUNTER — TELEPHONE (OUTPATIENT)
Dept: CARDIOLOGY CLINIC | Age: 54
End: 2019-01-26

## 2019-01-26 NOTE — TELEPHONE ENCOUNTER
Limited Echo 1/24/19 - Definity - LVEF 60%    Lexiscan Cardiolite 1/25/19 - Essentially normal study. No ischemia. LVEF 65%    Will have nurse call with normal echo and stress test results.

## 2019-01-28 ENCOUNTER — TELEPHONE (OUTPATIENT)
Dept: CARDIOLOGY CLINIC | Age: 54
End: 2019-01-28

## 2019-01-28 NOTE — TELEPHONE ENCOUNTER
----- Message from Brenton Staton MD sent at 1/26/2019 12:05 PM EST -----  Regarding: please call patient  Please call patient. Limited Echo 1/24/19 - Definity - LVEF 60%    Lexiscan Cardiolite 1/25/19 - Essentially normal study. No ischemia. LVEF 65%    Will have nurse call with normal echo and stress test results.        Thanks,  SK

## 2019-01-28 NOTE — TELEPHONE ENCOUNTER
Pt stated that because his Echo and Yael was fine. Does he still need to keep his appt with Dr. Collin Jamison on 1/31. Pt need to know today so his wife don't take off work. He can be reached at [phone].     [employer]

## 2019-01-28 NOTE — TELEPHONE ENCOUNTER
Pt notified. Pt wanted to know if Dr Denton Frausto reviewed his monitor results. Did he wear a holter or event monitor?

## 2019-01-29 NOTE — TELEPHONE ENCOUNTER
Murray Carranza, RN   Caller: Unspecified (Yesterday,  1:41 PM)             EOS is not ready since it just finished today & havent received monitor back yet.  You can look at all recordings on website

## 2019-04-03 ENCOUNTER — HOSPITAL ENCOUNTER (OUTPATIENT)
Dept: CT IMAGING | Age: 54
Discharge: HOME | End: 2019-04-03
Attending: INTERNAL MEDICINE
Payer: COMMERCIAL

## 2019-04-03 DIAGNOSIS — R91.8 PULMONARY NODULES: ICD-10-CM

## 2019-04-03 PROCEDURE — 71260 CT THORAX DX C+: CPT

## 2019-04-03 PROCEDURE — 74011636320 HC RX REV CODE- 636/320: Performed by: RADIOLOGY

## 2019-04-03 RX ADMIN — IOPAMIDOL 100 ML: 612 INJECTION, SOLUTION INTRAVENOUS at 09:31

## 2019-04-08 ENCOUNTER — OFFICE VISIT (OUTPATIENT)
Dept: ONCOLOGY | Age: 54
End: 2019-04-08

## 2019-04-08 VITALS
HEART RATE: 85 BPM | RESPIRATION RATE: 16 BRPM | BODY MASS INDEX: 42.37 KG/M2 | SYSTOLIC BLOOD PRESSURE: 108 MMHG | DIASTOLIC BLOOD PRESSURE: 58 MMHG | TEMPERATURE: 98.1 F | WEIGHT: 296 LBS | OXYGEN SATURATION: 98 % | HEIGHT: 70 IN

## 2019-04-08 DIAGNOSIS — R91.8 PULMONARY NODULES: ICD-10-CM

## 2019-04-08 DIAGNOSIS — I26.99 OTHER ACUTE PULMONARY EMBOLISM WITHOUT ACUTE COR PULMONALE (HCC): Primary | ICD-10-CM

## 2019-04-08 NOTE — PROGRESS NOTES
[address] [facility] at [facility]  [phone]    Hematology / Oncology Established Visit    Reason for Visit:   Pennie Diaz is a 46 y.o. male who comes in for f/u PE. History of Present Illness:   Mr. Ly Beasley is a 46 y.o. male who comes in for follow up of recently diagnosed bilateral pulmonary emboli. He underwent right shoulder surgery on 12/17/18. He was instructed to take ASA 81mg/d which he was doing. Then on 12/22/18, he had syncopal episode at home and went in to the ED. He was found to have bilateral pulmonary emboli. Venous doppler lower extremity and RUE neg for acute DVT. He also had cervical (C5-C6) spinal fusion surgery in 8/2018. He was smoking cigarettes, but quit in June 2018. No prior personal history and no family h/o venous thromboembolism (VTE). Never had a colonoscopy. He is currently taking Eliquis 5mg bid as prescribed. Given syncopal episode and collapse as well as elevated troponin, patient was evaluated by Cardiology. Echocardiogram showed a normal LV function with no wall motion abnormalities. Pt is scheduled for an outpatient stress test and 30 day monitor. Bishnu Gil was also found to have hyperglycemia with HgbA1c 7.0. He states he is working on diet and exercise first, no medications yet. Patient is doing well on Eliquis, no bruising/bleeding.      Past Medical History:   Diagnosis Date    Arthritis     Chronic pain     lower back pain    Diabetes ([address]     Morbid obesity with BMI of 45.0-49.9, adult ([address] 08/01/2018    BMI= 45.9      Past Surgical History:   Procedure Laterality Date    HX HERNIA REPAIR  71/2375    umbilical    HX HERNIA REPAIR  2016    incisional  (above umbilicar repair)    HX KNEE ARTHROSCOPY Right 1996    HX OPEN CHOLECYSTECTOMY  2014    HX OTHER SURGICAL Left     inguinal hernia repair    HX SHOULDER ARTHROSCOPY Left 2010      Social History     Tobacco Use    Smoking status: Former Smoker     Packs/day: 1.00 Years: 15.00     Pack years: 15.00     Last attempt to quit: 2018     Years since quittin.8    Smokeless tobacco: Never Used   Substance Use Topics    Alcohol use: No     Alcohol/week: 0.5 oz     Types: 1 Cans of beer per week     Frequency: Never      Family History   Problem Relation Age of Onset    Hypertension Mother     Diabetes Mother     Arrhythmia Mother         pacemaker    Cancer Father         lung    Hypertension Sister     Diabetes Sister     Hypertension Brother     No Known Problems Brother     No Known Problems Brother      Current Outpatient Medications   Medication Sig    apixaban (ELIQUIS) 5 mg tablet Take 2 tabs (10 mg) orally BID x 7 days then 1 tab (5mg) orally BID thereafter as directed    oxyCODONE-acetaminophen (PERCOCET) 5-325 mg per tablet Take 1-2 Tabs by mouth every six (6) hours as needed for Pain. Max Daily Amount: 8 Tabs. No current facility-administered medications for this visit. No Known Allergies     Review of Systems: A complete review of systems was obtained, negative except as described above. Physical Exam:     Visit Vitals  Ht 5' 10\" (1.778 m)   BMI 44.71 kg/m²     ECOG PS: 0  General: Well developed, no acute distress, obese  Eyes: PERRLA, EOMI, anicteric sclerae  HENT: Atraumatic, OP clear, TMs intact without erythema  Neck: Supple  Lymphatic: No cervical, supraclavicular, axillary or inguinal adenopathy  Respiratory: CTAB, normal respiratory effort  CV: Normal rate, regular rhythm, no murmurs, no peripheral edema  GI: Soft, nontender, nondistended, no masses, no hepatomegaly, no splenomegaly  MS: Normal gait and station. Digits without clubbing or cyanosis. R arm in a sling. Skin: No rashes, ecchymoses, or petechiae. Normal temperature, turgor, and texture. Neuro/Psych: Alert, oriented. 5/5 strength in all 4 extremities. Appropriate affect, normal judgment/insight.     Results:     Lab Results   Component Value Date/Time    WBC 11.5 (H) 12/22/2018 07:55 AM    HGB 13.8 12/22/2018 07:55 AM    HCT 42.4 12/22/2018 07:55 AM    PLATELET 634 25/69/3405 07:55 AM    MCV 89.5 12/22/2018 07:55 AM    ABS. NEUTROPHILS 5.3 12/22/2018 07:55 AM     Lab Results   Component Value Date/Time    Sodium 137 12/22/2018 07:55 AM    Potassium 4.1 12/22/2018 07:55 AM    Chloride 102 12/22/2018 07:55 AM    CO2 26 12/22/2018 07:55 AM    Glucose 215 (H) 12/22/2018 07:55 AM    BUN 16 12/22/2018 07:55 AM    Creatinine 1.28 12/22/2018 07:55 AM    GFR est AA >60 12/22/2018 07:55 AM    GFR est non-AA 59 (L) 12/22/2018 07:55 AM    Calcium 8.6 12/22/2018 07:55 AM     Lab Results   Component Value Date/Time    Bilirubin, total 0.5 12/22/2018 07:55 AM    ALT (SGPT) 39 12/22/2018 07:55 AM    AST (SGOT) 34 12/22/2018 07:55 AM    Alk. phosphatase 66 12/22/2018 07:55 AM    Protein, total 7.7 12/22/2018 07:55 AM    Albumin 3.4 (L) 12/22/2018 07:55 AM    Globulin 4.3 (H) 12/22/2018 07:55 AM     No results found for: IRON, FE, TIBC, IBCT, PSAT, FERR    Lab Results   Component Value Date/Time    Vitamin B12 537 12/22/2018 09:40 AM     No results found for: TSH, TSH2, TSH3, TSHP, TSHEXT, TSHEXT  Lab Results   Component Value Date/Time    Hepatitis A, IgM NONREACTIVE 12/22/2018 09:40 AM    Hepatitis B surface Ag <0.10 12/22/2018 09:40 AM    Hepatitis B core, IgM NONREACTIVE 12/22/2018 09:40 AM         Imaging:     Chest CTA 12/22/18:   FINDINGS:  Right middle lobe 6.3 mm pleural parenchymal nodule (image 50). Right lower lobe  5.9 mm pleural parenchymal nodule (image 45). 2 mm right middle lobe pleural  parenchymal nodule (image 27). Right upper lobe 6.6 mm pleural parenchymal  nodule (image 66). Right upper lobe posterior 6.9 mm pleural parenchymal nodule  (image 72). Positive cast-like pulmonary emboli in the right main pulmonary artery,  extending into the right upper lobe and lower lobe. Left pulmonary artery  catheter-like emboli extending into the lingula and left lower lobe.   There is no mediastinal or hilar adenopathy or mass. The aorta enhances normally  without evidence of aneurysm or dissection. The visualized portions of the upper abdominal organs are normal.  IMPRESSION:  Positive bilateral pulmonary emboli. Multiple new right pleural parenchymal nodules as described    CT chest 4/3/19: FINDINGS:  LUNGS: Sub-6 mm, subpleural nodules are scattered around the right lung. No left  lung nodules. No background emphysema. TRACHEA/BRONCHI: The central airways are patent. PLEURA: No pneumothorax or pleural effusion. HEART: Normal size. THORACIC AORTA: Normal caliber. MAIN PULMONARY ARTERY: Normal caliber. MEDIASTINUM/YOLANDA: No lymphadenopathy. ESOPHAGUS: Normal.  THYROID: No nodule. UPPER ABDOMEN: The visualized upper abdomen is normal. Post cholecystectomy. BONES: No suspicious lucent or sclerotic lesions. IMPRESSION:  1. Multiple, stable, sub-6 mm, subpleural pulmonary nodules. 2017 Fleischner setting Guidelines requires no follow-up in a low-risk patient and an \"optional\"  12 month follow-up in a high risk patient. 2. No background emphysema.      Assessment & Plan:   Yuko Boogie is a 46 y.o. male comes in for evaluation and management. 1. Bilateral pulmonary emboli: Provoked by recent surgery and immobility. Obesity is also a minor risk factor. Testing for inherited thrombophilias is not recommended in patients with a provoked VTE. I recommend that patient continue anticoagulation for 6 months. No elective procedures that would require interrupting anticoagulation in the first 3 months. Patient is overdue for and will need a screening colonoscopy this year, but will plan for this after he completes anticoagulation. -- Continue anticoagulation for 6 months (Eliquis 5mg bid) - which will be completed at end of June 2019.  -- Return as needed, certainly prior to any surgical procedures    2.  Pulmonary nodules: Discussed during thoracic tumor board, are peripheral and easily accessible if biopsy needed in the future, but appear benign at this time. -- Repeat chest CT in 12 months per [facility] Society guidelines - will review results at next visit in 1 yr  -- f/u in 1 yr    3. Obesity: Counseled on weight loss through calorie counting, dietary portion control, and daily activity/exercise. He is using the [employer] elizabeth.    4. Hyperglycemia / type II DM: Sees ADRIEN Gonzalez who works with Dr. Saul Gallagher. Pt will start on diet and exercise. He is doing calorie counting. 5. Elevated troponin: Evaluated by Dr. Francisco Bob and is scheduled for stress test in the near future. I appreciate the opportunity to participate in Mr. Jori Camacho Jr.'s care.     Signed By: Serena Bello MD     April 8, 2019

## 2019-06-07 ENCOUNTER — TELEPHONE (OUTPATIENT)
Dept: ONCOLOGY | Age: 54
End: 2019-06-07

## 2019-06-07 NOTE — TELEPHONE ENCOUNTER
Patients wife called and stated she has questions regarding his Eliquis and when he should stop taking it.  PT#[phone]

## 2019-06-07 NOTE — TELEPHONE ENCOUNTER
Patients wife called and stated she received a voicemail from us and requested a call back. Stated to please leave a detailed message if she is unable to answer.  AE#[phone]

## 2019-06-07 NOTE — TELEPHONE ENCOUNTER
Spoke with wife, Melodie Solano. Advised ok to stop Eliquis at end of this month per Arash Ghosh NP. States patient has 13 day supply remaining and will run out of pills on 6/20. States if refill is needed to finish out month, she requests only 10 day supply be ordered. Advised will forward to [facility] & return call with recommendation.

## 2019-06-10 NOTE — TELEPHONE ENCOUNTER
Call placed to wife, Lydia Park. Detailed message left on self identified voicemail. Stefany Willams for patient to stop Eliquis once he runs out of pills on 6/20, no further refills needed per Star Kohli NP. To call office if any concerns or questions.

## 2020-03-30 ENCOUNTER — TELEPHONE (OUTPATIENT)
Dept: ONCOLOGY | Age: 55
End: 2020-03-30

## 2020-03-30 DIAGNOSIS — R91.8 PULMONARY NODULES: Primary | ICD-10-CM

## 2020-03-30 NOTE — TELEPHONE ENCOUNTER
Patients wife canceled patient sappt--- they also canceled his CT Scan appt they didn't feel that this was an urgent matter and wanted to wait until this Covid Virus  Stuff passes    Thanks    I didn't cancel the appt yet until I made you aware to see what we needed to do

## 2020-06-27 ENCOUNTER — HOSPITAL ENCOUNTER (OUTPATIENT)
Dept: CT IMAGING | Age: 55
Discharge: HOME | End: 2020-06-27
Attending: NURSE PRACTITIONER
Payer: COMMERCIAL

## 2020-06-27 DIAGNOSIS — R91.8 PULMONARY NODULES: ICD-10-CM

## 2020-06-27 PROCEDURE — 74011636320 HC RX REV CODE- 636/320: Performed by: NURSE PRACTITIONER

## 2020-06-27 PROCEDURE — 71260 CT THORAX DX C+: CPT

## 2020-06-27 RX ADMIN — IOPAMIDOL 100 ML: 612 INJECTION, SOLUTION INTRAVENOUS at 08:27

## 2020-06-29 NOTE — PROGRESS NOTES
[address] [facility] Oncology at [address] [place] [address]   [phone]    Hematology / Oncology Established Visit    Reason for Visit:   Arun Hightower is a 53 y.o. male who comes in for f/u PE, pulmonary nodule. History of Present Illness:   Mr. Lupe Gannon is a 53 y.o. male who comes in for follow up of recently diagnosed bilateral pulmonary emboli. He underwent right shoulder surgery on 12/17/18. He was instructed to take ASA 81mg/d which he was doing. Then on 12/22/18, he had syncopal episode at home and went in to the ED. He was found to have bilateral pulmonary emboli. Venous doppler lower extremity and RUE neg for acute DVT. He also had cervical (C5-C6) spinal fusion surgery in 8/2018. He was smoking cigarettes, but quit in June 2018. No prior personal history and no family h/o venous thromboembolism (VTE). Never had a colonoscopy. He is currently taking Eliquis 5mg bid as prescribed. Given syncopal episode and collapse as well as elevated troponin, patient was evaluated by Cardiology. Echocardiogram showed a normal LV function with no wall motion abnormalities. Pt is scheduled for an outpatient stress test and 30 day monitor. Tatiana Duran was also found to have hyperglycemia with HgbA1c 7.0. He states he is working on diet and exercise first, no medications yet. Interval History:   Pt has h/o provoked PE and previously completed anticoagulation in June 2019. He comes in today for f/u of pulmonary nodule and review of repeat chest CT. He is doing well overall. His wife is available on speaker phone and raises concern about pt having pitting edema in bilateral legs. He has not seen his PCP recently.      Past Medical History:   Diagnosis Date    Arthritis     Chronic pain     lower back pain    [facility]     Morbid obesity with BMI of 45.0-49.9, adult ([facility] 08/01/2018    BMI= 45.9      Past Surgical History:   Procedure Laterality Date    HX HERNIA REPAIR  12/8412    umbilical    HX HERNIA REPAIR     incisional  (above umbilicar repair)    HX KNEE ARTHROSCOPY Right 1996    HX OPEN CHOLECYSTECTOMY  2014    HX OTHER SURGICAL Left     inguinal hernia repair    HX SHOULDER ARTHROSCOPY Left       Social History     Tobacco Use    Smoking status: Former Smoker     Packs/day: 1.00     Years: 15.00     Pack years: 15.00     Last attempt to quit: 2018     Years since quittin.0    Smokeless tobacco: Never Used   Substance Use Topics    Alcohol use: No     Alcohol/week: 0.8 standard drinks     Types: 1 Cans of beer per week     Frequency: Never      Family History   Problem Relation Age of Onset    Hypertension Mother     Diabetes Mother     Arrhythmia Mother         pacemaker    Cancer Father         lung    Hypertension Sister     Diabetes Sister     Hypertension Brother     No Known Problems Brother     No Known Problems Brother      Current Outpatient Medications   Medication Sig    apixaban (ELIQUIS) 5 mg tablet Take 2 tabs (10 mg) orally BID x 7 days then 1 tab (5mg) orally BID thereafter as directed    oxyCODONE-acetaminophen (PERCOCET) 5-325 mg per tablet Take 1-2 Tabs by mouth every six (6) hours as needed for Pain. Max Daily Amount: 8 Tabs. No current facility-administered medications for this visit. No Known Allergies     Review of Systems: A complete review of systems was obtained, negative except as described above.     Physical Exam:     Visit Vitals  /79 (BP 1 Location: Left arm, BP Patient Position: Sitting)   Pulse 72   Temp (!) 96.6 °F (35.9 °C) (Temporal)   Ht 5' 10\" (1.778 m)   Wt 313 lb (142 kg)   SpO2 99%   BMI 44.91 kg/m²     ECOG PS: 0  General: Well developed, no acute distress, obese  Eyes: PERRLA, EOMI, anicteric sclerae  HENT: Atraumatic, OP clear, TMs intact without erythema  Neck: Supple  Lymphatic: No cervical, supraclavicular, axillary or inguinal adenopathy  Respiratory: CTAB, normal respiratory effort  CV: Normal rate, regular rhythm, no murmurs, no peripheral edema  GI: Soft, nontender, nondistended, no masses, no hepatomegaly, no splenomegaly  MS: Normal gait and station. Digits without clubbing or cyanosis. Skin: No rashes, ecchymoses, or petechiae. Normal temperature, turgor, and texture. Neuro/Psych: Alert, oriented. 5/5 strength in all 4 extremities. Appropriate affect, normal judgment/insight. Results:     Lab Results   Component Value Date/Time    WBC 11.5 (H) 12/22/2018 07:55 AM    HGB 13.8 12/22/2018 07:55 AM    HCT 42.4 12/22/2018 07:55 AM    PLATELET 929 77/53/7400 07:55 AM    MCV 89.5 12/22/2018 07:55 AM    ABS. NEUTROPHILS 5.3 12/22/2018 07:55 AM     Lab Results   Component Value Date/Time    Sodium 137 12/22/2018 07:55 AM    Potassium 4.1 12/22/2018 07:55 AM    Chloride 102 12/22/2018 07:55 AM    CO2 26 12/22/2018 07:55 AM    Glucose 215 (H) 12/22/2018 07:55 AM    BUN 16 12/22/2018 07:55 AM    Creatinine 1.28 12/22/2018 07:55 AM    GFR est AA >60 12/22/2018 07:55 AM    GFR est non-AA 59 (L) 12/22/2018 07:55 AM    Calcium 8.6 12/22/2018 07:55 AM     Lab Results   Component Value Date/Time    Bilirubin, total 0.5 12/22/2018 07:55 AM    ALT (SGPT) 39 12/22/2018 07:55 AM    Alk. phosphatase 66 12/22/2018 07:55 AM    Protein, total 7.7 12/22/2018 07:55 AM    Albumin 3.4 (L) 12/22/2018 07:55 AM    Globulin 4.3 (H) 12/22/2018 07:55 AM     No results found for: IRON, FE, TIBC, IBCT, PSAT, FERR    Lab Results   Component Value Date/Time    Vitamin B12 537 12/22/2018 09:40 AM     No results found for: TSH, TSH2, TSH3, TSHP, TSHEXT, TSHEXT  Lab Results   Component Value Date/Time    Hepatitis A, IgM NONREACTIVE 12/22/2018 09:40 AM    Hepatitis B surface Ag <0.10 12/22/2018 09:40 AM    Hepatitis B core, IgM NONREACTIVE 12/22/2018 09:40 AM         Imaging:     Chest CTA 12/22/18:   FINDINGS:  Right middle lobe 6.3 mm pleural parenchymal nodule (image 50). Right lower lobe  5.9 mm pleural parenchymal nodule (image 45).  2 mm right middle lobe pleural  parenchymal nodule (image 27). Right upper lobe 6.6 mm pleural parenchymal  nodule (image 66). Right upper lobe posterior 6.9 mm pleural parenchymal nodule  (image 72). Positive cast-like pulmonary emboli in the right main pulmonary artery,  extending into the right upper lobe and lower lobe. Left pulmonary artery  catheter-like emboli extending into the lingula and left lower lobe. There is no mediastinal or hilar adenopathy or mass. The aorta enhances normally  without evidence of aneurysm or dissection. The visualized portions of the upper abdominal organs are normal.  IMPRESSION:  Positive bilateral pulmonary emboli. Multiple new right pleural parenchymal nodules as described    CT chest 4/3/19: FINDINGS:  LUNGS: Sub-6 mm, subpleural nodules are scattered around the right lung. No left  lung nodules. No background emphysema. TRACHEA/BRONCHI: The central airways are patent. PLEURA: No pneumothorax or pleural effusion. HEART: Normal size. THORACIC AORTA: Normal caliber. MAIN PULMONARY ARTERY: Normal caliber. MEDIASTINUM/YOLANDA: No lymphadenopathy. ESOPHAGUS: Normal.  THYROID: No nodule. UPPER ABDOMEN: The visualized upper abdomen is normal. Post cholecystectomy. BONES: No suspicious lucent or sclerotic lesions. IMPRESSION:  1. Multiple, stable, sub-6 mm, subpleural pulmonary nodules. 2017 Fleischner setting Guidelines requires no follow-up in a low-risk patient and an \"optional\"  12 month follow-up in a high risk patient. 2. No background emphysema. 6/27/20 Chest CT:  FINDINGS:  CHEST WALL: No mass or axillary lymphadenopathy. THYROID: No nodule. MEDIASTINUM: No mass or lymphadenopathy. YOLANDA: No mass or lymphadenopathy. THORACIC AORTA: No dissection or aneurysm. MAIN PULMONARY ARTERY: Normal in caliber. TRACHEA/BRONCHI: Patent. ESOPHAGUS: No wall thickening or dilatation. HEART: Normal in size. PLEURA: No effusion or pneumothorax.   LUNGS: Stable bilateral subpleural nodules. INCIDENTALLY IMAGED UPPER ABDOMEN: No significant abnormality in the  incidentally imaged upper abdomen. BONES: No destructive bone lesion. IMPRESSION:   no change, stable bilateral subpleural nodules.      Assessment & Plan:   Amari Myrick is a 53 y.o. male comes in for evaluation and management. 1. Bilateral pulmonary emboli: Provoked by recent surgery and immobility. Obesity is also a minor risk factor. Testing for inherited thrombophilias is not recommended in patients with a provoked VTE. I recommend that patient continue anticoagulation for 6 months. No elective procedures that would require interrupting anticoagulation in the first 3 months. Patient is overdue for and will need a screening colonoscopy this year, but will plan for this after he completes anticoagulation. -- Completed 6 months of anticoagulation in June 2019.  -- Return as needed, certainly prior to any surgical procedures    2. Pulmonary nodules: Previously discussed during thoracic tumor board: nodules are peripheral and easily accessible if biopsy needed in the future, but appear benign at this time. Stable on imaging per chest CT done 12 months later. Per [employer] Society guidelines, no further follow up needed. 3. Obesity: Previously counseled on weight loss through calorie counting, dietary portion control, and daily activity/exercise. He is using the [employer] elizabeth.    4. Hyperglycemia / type II DM: Sees ADRIEN Santo who works with Dr. Eulogio Coto. At last visit, pt stated he was working on modified diet and increasing exercise. Advised to do calorie counting. 5. Elevated troponin: Evaluated by Dr. Lalita Gerber and is scheduled for stress test in the near future. 6. Elevated BP: Initial BP today was 166/89. Repeat BP after rest was 132/84. Follow up with PCP. I appreciate the opportunity to participate in Mr. Yamini Arevalo 's care.     Signed By: Jenae Fuller MD     July 2, 2020

## 2020-07-02 ENCOUNTER — OFFICE VISIT (OUTPATIENT)
Dept: ONCOLOGY | Age: 55
End: 2020-07-02

## 2020-07-02 VITALS
HEIGHT: 70 IN | SYSTOLIC BLOOD PRESSURE: 132 MMHG | OXYGEN SATURATION: 99 % | WEIGHT: 313 LBS | DIASTOLIC BLOOD PRESSURE: 84 MMHG | TEMPERATURE: 96.6 F | HEART RATE: 72 BPM | BODY MASS INDEX: 44.81 KG/M2

## 2020-07-02 DIAGNOSIS — E11.9 TYPE 2 DIABETES MELLITUS WITHOUT COMPLICATION, WITHOUT LONG-TERM CURRENT USE OF INSULIN (HCC): ICD-10-CM

## 2020-07-02 DIAGNOSIS — I26.99 OTHER ACUTE PULMONARY EMBOLISM WITHOUT ACUTE COR PULMONALE (HCC): ICD-10-CM

## 2020-07-02 DIAGNOSIS — R03.0 ELEVATED BP WITHOUT DIAGNOSIS OF HYPERTENSION: ICD-10-CM

## 2020-07-02 DIAGNOSIS — R91.8 PULMONARY NODULES: Primary | ICD-10-CM

## 2020-07-02 NOTE — PROGRESS NOTES
Allan Hitchcock. is a 53 y.o. male here for follow up of pulmonary nodule. Patient with no complaints of pain at this time.

## 2022-03-19 PROBLEM — E66.01 MORBID OBESITY WITH BMI OF 45.0-49.9, ADULT (HCC): Status: ACTIVE | Noted: 2018-08-01

## 2022-03-19 PROBLEM — I26.99 BILATERAL PULMONARY EMBOLISM (HCC): Status: ACTIVE | Noted: 2018-12-22

## 2022-03-19 PROBLEM — R55 SYNCOPE AND COLLAPSE: Status: ACTIVE | Noted: 2018-12-23

## 2022-03-20 PROBLEM — M48.02 CERVICAL STENOSIS OF SPINAL CANAL: Status: ACTIVE | Noted: 2018-08-06

## 2023-05-25 RX ORDER — OXYCODONE HYDROCHLORIDE AND ACETAMINOPHEN 5; 325 MG/1; MG/1
1-2 TABLET ORAL EVERY 6 HOURS PRN
COMMUNITY
Start: 2018-08-07

## (undated) DEVICE — STERILE POLYISOPRENE POWDER-FREE SURGICAL GLOVES WITH EMOLLIENT COATING: Brand: PROTEXIS

## (undated) DEVICE — ACCY PA100-A LEGEND LUB/DIFFUSER 4 PACK: Brand: MIDAS REX®

## (undated) DEVICE — SYR 5ML 1/5 GRAD LL NSAF LF --

## (undated) DEVICE — STERILE POLYISOPRENE POWDER-FREE SURGICAL GLOVES: Brand: PROTEXIS

## (undated) DEVICE — 3M™ TEGADERM™ TRANSPARENT FILM DRESSING FRAME STYLE, 1624W, 2-3/8 IN X 2-3/4 IN (6 CM X 7 CM), 100/CT 4CT/CASE: Brand: 3M™ TEGADERM™

## (undated) DEVICE — SUTURE MCRYL SZ 4-0 L27IN ABSRB UD L19MM PS-2 1/2 CIR PRIM Y426H

## (undated) DEVICE — SYRINGE MED 20ML STD CLR PLAS LUERLOCK TIP N CTRL DISP

## (undated) DEVICE — TIP CLEANER: Brand: VALLEYLAB

## (undated) DEVICE — BONE WAX WHITE: Brand: BONE WAX WHITE

## (undated) DEVICE — DRAPE,UTILITY,TAPE,15X26,STERILE: Brand: MEDLINE

## (undated) DEVICE — SOLUTION IRRIG 1000ML H2O STRL BLT

## (undated) DEVICE — NDL SPNE QNCKE 18GX3.5IN LF --

## (undated) DEVICE — REM POLYHESIVE ADULT PATIENT RETURN ELECTRODE: Brand: VALLEYLAB

## (undated) DEVICE — TOOL 14MH30 LEGEND 14CM 3MM: Brand: MIDAS REX ™

## (undated) DEVICE — MASTISOL ADHESIVE LIQ 2/3ML

## (undated) DEVICE — GOWN,SIRUS,NONRNF,SETINSLV,XL,20/CS: Brand: MEDLINE

## (undated) DEVICE — SPONGE: SPECIALTY PEANUT XR 100/CS: Brand: MEDICAL ACTION INDUSTRIES

## (undated) DEVICE — COVER,MAYO STAND,STERILE: Brand: MEDLINE

## (undated) DEVICE — DRAPE,CHEST,FENES,15X10,STERIL: Brand: MEDLINE

## (undated) DEVICE — BIPOLAR FORCEPS CORD: Brand: VALLEYLAB

## (undated) DEVICE — DRAIN KT WND 10FR RND 400ML --

## (undated) DEVICE — 3000CC GUARDIAN II: Brand: GUARDIAN

## (undated) DEVICE — DRAPE,REIN 53X77,STERILE: Brand: MEDLINE

## (undated) DEVICE — INSULATED BLADE ELECTRODE: Brand: EDGE

## (undated) DEVICE — COVER,TABLE,60X90,STERILE: Brand: MEDLINE

## (undated) DEVICE — MEDI-VAC NON-CONDUCTIVE SUCTION TUBING: Brand: CARDINAL HEALTH

## (undated) DEVICE — 3M™ TEGADERM™ TRANSPARENT FILM DRESSING FRAME STYLE, 1626W, 4 IN X 4-3/4 IN (10 CM X 12 CM), 50/CT 4CT/CASE: Brand: 3M™ TEGADERM™

## (undated) DEVICE — STOPCOCK IV 3W --

## (undated) DEVICE — 3M™ DURAPORE™ SURGICAL TAPE 1538-3, 3 INCH X 10 YARD (7,5CM X 9,1M), 4 ROLLS/BOX: Brand: 3M™ DURAPORE™

## (undated) DEVICE — ROCKER SWITCH PENCIL BLADE ELECTRODE, HOLSTER: Brand: EDGE

## (undated) DEVICE — SURGICAL PROCEDURE PACK BASIN MAJ SET CUST NO CAUT

## (undated) DEVICE — DRAPE MICSCP W46XL120IN FOR ZEISS MD FEATURING CLEARLENS

## (undated) DEVICE — PREP CHLORAPREP 10.5 ML ORG --

## (undated) DEVICE — DRAPE XR C ARM 41X74IN LF --

## (undated) DEVICE — BASIC PACK: Brand: CONVERTORS

## (undated) DEVICE — BIT DRL L14MM DIA2.3MM CERV STP W/ EPOXY RNG DISP PYRENEES

## (undated) DEVICE — LIGHT HANDLE: Brand: DEVON

## (undated) DEVICE — INFECTION CONTROL KIT SYS

## (undated) DEVICE — KENDALL SCD EXPRESS SLEEVES, KNEE LENGTH, MEDIUM: Brand: KENDALL SCD

## (undated) DEVICE — TELFA NON-ADHERENT ABSORBENT DRESSING: Brand: TELFA

## (undated) DEVICE — DEVON™ KNEE AND BODY STRAP 60" X 3" (1.5 M X 7.6 CM): Brand: DEVON

## (undated) DEVICE — CATHETER IV 14GA L1.25IN TEF STR HUB INTROCAN SFTY

## (undated) DEVICE — MAGNETIC DRAPE: Brand: DEVON

## (undated) DEVICE — SOLUTION IV 1000ML 0.9% SOD CHL

## (undated) DEVICE — 3M™ IOBAN™ 2 ANTIMICROBIAL INCISE DRAPE 6650EZ: Brand: IOBAN™ 2

## (undated) DEVICE — (D)STRIP SKN CLSR 0.5X4IN WHT --

## (undated) DEVICE — CODMAN® SURGICAL PATTIES 3/4" X 3/4" (1.91CM X 1.91CM): Brand: CODMAN®

## (undated) DEVICE — 1010 S-DRAPE TOWEL DRAPE 10/BX: Brand: STERI-DRAPE™

## (undated) DEVICE — SUTURE VCRL SZ 3-0 L27IN ABSRB UD L26MM SH 1/2 CIR J416H